# Patient Record
Sex: FEMALE | Race: WHITE | NOT HISPANIC OR LATINO | Employment: OTHER | URBAN - NONMETROPOLITAN AREA
[De-identification: names, ages, dates, MRNs, and addresses within clinical notes are randomized per-mention and may not be internally consistent; named-entity substitution may affect disease eponyms.]

---

## 2019-10-16 ENCOUNTER — TELEPHONE (OUTPATIENT)
Dept: OBGYN | Facility: OTHER | Age: 34
End: 2019-10-16

## 2019-10-16 NOTE — TELEPHONE ENCOUNTER
Wayne Brooks Patient:     Positive pregnancy test : Yes       P:1  Vaginal or C/S:vaginal   LMP :  GA: 5w5d  Prenatal vitamins?: Yes   Bleeding?: No  Cramping?: No  1-sided pelvic pain?: No  Advised patient to be seen ASAP if any of the above symptoms.      NewOB appt scheduled with Wayne on- 2019 @ 3:30    Pt declined dating US.

## 2019-11-20 ENCOUNTER — PRENATAL OFFICE VISIT (OUTPATIENT)
Dept: OBGYN | Facility: OTHER | Age: 34
End: 2019-11-20
Attending: ADVANCED PRACTICE MIDWIFE
Payer: COMMERCIAL

## 2019-11-20 VITALS
SYSTOLIC BLOOD PRESSURE: 108 MMHG | DIASTOLIC BLOOD PRESSURE: 60 MMHG | BODY MASS INDEX: 22.13 KG/M2 | HEART RATE: 68 BPM | WEIGHT: 146 LBS | HEIGHT: 68 IN

## 2019-11-20 DIAGNOSIS — Z11.3 SCREEN FOR STD (SEXUALLY TRANSMITTED DISEASE): ICD-10-CM

## 2019-11-20 DIAGNOSIS — Z34.81 ENCOUNTER FOR SUPERVISION OF OTHER NORMAL PREGNANCY, FIRST TRIMESTER: Primary | ICD-10-CM

## 2019-11-20 DIAGNOSIS — Z34.81 ENCOUNTER FOR SUPERVISION OF OTHER NORMAL PREGNANCY IN FIRST TRIMESTER: ICD-10-CM

## 2019-11-20 LAB
ABO + RH BLD: NORMAL
ABO + RH BLD: NORMAL
ALBUMIN UR-MCNC: NEGATIVE MG/DL
AMPHETAMINES UR QL: NOT DETECTED NG/ML
APPEARANCE UR: CLEAR
BACTERIA #/AREA URNS HPF: ABNORMAL /HPF
BARBITURATES UR QL SCN: NOT DETECTED NG/ML
BENZODIAZ UR QL SCN: NOT DETECTED NG/ML
BILIRUB UR QL STRIP: NEGATIVE
BLD GP AB SCN SERPL QL: NORMAL
BLOOD BANK CMNT PATIENT-IMP: NORMAL
BUPRENORPHINE UR QL: NOT DETECTED NG/ML
CANNABINOIDS UR QL: NOT DETECTED NG/ML
COCAINE UR QL SCN: NOT DETECTED NG/ML
COLOR UR AUTO: ABNORMAL
D-METHAMPHET UR QL: NOT DETECTED NG/ML
ERYTHROCYTE [DISTWIDTH] IN BLOOD BY AUTOMATED COUNT: 12.2 % (ref 10–15)
GLUCOSE UR STRIP-MCNC: NEGATIVE MG/DL
HCT VFR BLD AUTO: 36.3 % (ref 35–47)
HGB BLD-MCNC: 12.7 G/DL (ref 11.7–15.7)
HGB UR QL STRIP: NEGATIVE
HYALINE CASTS #/AREA URNS LPF: 1 /LPF
KETONES UR STRIP-MCNC: NEGATIVE MG/DL
LEUKOCYTE ESTERASE UR QL STRIP: ABNORMAL
MCH RBC QN AUTO: 31.4 PG (ref 26.5–33)
MCHC RBC AUTO-ENTMCNC: 35 G/DL (ref 31.5–36.5)
MCV RBC AUTO: 90 FL (ref 78–100)
METHADONE UR QL SCN: NOT DETECTED NG/ML
MUCOUS THREADS #/AREA URNS LPF: PRESENT /LPF
NITRATE UR QL: NEGATIVE
OPIATES UR QL SCN: NOT DETECTED NG/ML
OXYCODONE UR QL SCN: NOT DETECTED NG/ML
PCP UR QL SCN: NOT DETECTED NG/ML
PH UR STRIP: 5.5 PH (ref 4.7–8)
PLATELET # BLD AUTO: 296 10E9/L (ref 150–450)
PROPOXYPH UR QL: NOT DETECTED NG/ML
RBC # BLD AUTO: 4.05 10E12/L (ref 3.8–5.2)
RBC #/AREA URNS AUTO: 0 /HPF (ref 0–2)
SOURCE: ABNORMAL
SP GR UR STRIP: 1.02 (ref 1–1.03)
SPECIMEN EXP DATE BLD: NORMAL
SPECIMEN SOURCE: NORMAL
SQUAMOUS #/AREA URNS AUTO: 2 /HPF (ref 0–1)
TRICYCLICS UR QL SCN: NOT DETECTED NG/ML
UROBILINOGEN UR STRIP-MCNC: NORMAL MG/DL (ref 0–2)
WBC # BLD AUTO: 9.7 10E9/L (ref 4–11)
WBC #/AREA URNS AUTO: 2 /HPF (ref 0–5)
WET PREP SPEC: NORMAL

## 2019-11-20 PROCEDURE — 81001 URINALYSIS AUTO W/SCOPE: CPT | Mod: 59 | Performed by: ADVANCED PRACTICE MIDWIFE

## 2019-11-20 PROCEDURE — 87210 SMEAR WET MOUNT SALINE/INK: CPT | Performed by: ADVANCED PRACTICE MIDWIFE

## 2019-11-20 PROCEDURE — 86762 RUBELLA ANTIBODY: CPT | Performed by: ADVANCED PRACTICE MIDWIFE

## 2019-11-20 PROCEDURE — 86900 BLOOD TYPING SEROLOGIC ABO: CPT | Performed by: ADVANCED PRACTICE MIDWIFE

## 2019-11-20 PROCEDURE — 36415 COLL VENOUS BLD VENIPUNCTURE: CPT | Performed by: ADVANCED PRACTICE MIDWIFE

## 2019-11-20 PROCEDURE — 87389 HIV-1 AG W/HIV-1&-2 AB AG IA: CPT | Performed by: ADVANCED PRACTICE MIDWIFE

## 2019-11-20 PROCEDURE — 87340 HEPATITIS B SURFACE AG IA: CPT | Performed by: ADVANCED PRACTICE MIDWIFE

## 2019-11-20 PROCEDURE — 86780 TREPONEMA PALLIDUM: CPT | Performed by: ADVANCED PRACTICE MIDWIFE

## 2019-11-20 PROCEDURE — 87591 N.GONORRHOEAE DNA AMP PROB: CPT | Performed by: ADVANCED PRACTICE MIDWIFE

## 2019-11-20 PROCEDURE — 87086 URINE CULTURE/COLONY COUNT: CPT | Performed by: ADVANCED PRACTICE MIDWIFE

## 2019-11-20 PROCEDURE — 80306 DRUG TEST PRSMV INSTRMNT: CPT | Performed by: ADVANCED PRACTICE MIDWIFE

## 2019-11-20 PROCEDURE — 85027 COMPLETE CBC AUTOMATED: CPT | Performed by: ADVANCED PRACTICE MIDWIFE

## 2019-11-20 PROCEDURE — 87491 CHLMYD TRACH DNA AMP PROBE: CPT | Performed by: ADVANCED PRACTICE MIDWIFE

## 2019-11-20 PROCEDURE — 99207 ZZC FIRST OB VISIT: CPT | Performed by: ADVANCED PRACTICE MIDWIFE

## 2019-11-20 PROCEDURE — 86850 RBC ANTIBODY SCREEN: CPT | Performed by: ADVANCED PRACTICE MIDWIFE

## 2019-11-20 PROCEDURE — 86901 BLOOD TYPING SEROLOGIC RH(D): CPT | Performed by: ADVANCED PRACTICE MIDWIFE

## 2019-11-20 RX ORDER — SACCHAROMYCES BOULARDII 250 MG
250 CAPSULE ORAL 2 TIMES DAILY
COMMUNITY
End: 2022-09-08

## 2019-11-20 RX ORDER — PRENATAL VIT/IRON FUM/FOLIC AC 27MG-0.8MG
1 TABLET ORAL DAILY
COMMUNITY

## 2019-11-20 ASSESSMENT — MIFFLIN-ST. JEOR: SCORE: 1410.75

## 2019-11-20 ASSESSMENT — PAIN SCALES - GENERAL: PAINLEVEL: NO PAIN (0)

## 2019-11-20 NOTE — PROGRESS NOTES
NEW OB VISIT  Letitia Whittaker is a 34 year old  at Unknown presenting for a new ob visit.      Currently taking Prenatal Vitamins? y  Folate y    ZIKA n    MEDICAL HISTORY:  Diabetes: No  Hypertension: No  Heart Disease: No  Autoimmune disorder: No  Kidney Disease/UTI: No  Neurologic Disease/Epilepsy:No  Psychiatric Disease: No  Depression/Postpartum Depression:No  Varicositites/Phlebitis: No  Hepatitis/Liver Disease: No  Thyroid Dysfunction: No  Trauma/Violence: No  History of Blood Transfusion: No  Tobacco Use: No  Alcohol Use: No  Illicit/Recreational Drugs: No  D (Rh Sensitized): unsure  Pulmonary Disease (TB/Asthma): No  Drug/Latex Allergies/Reactions: No  Breast: No  GYN Surgery:No  Operations/Hospitalizations:Yes, 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  Anesthetic Complications: No  History of Abnormal Pap:No  Uterine Anomalies/TATI: No  Infertility: No  Artifical Reproductive Technologies Treatment: No  Relevant Family History:No  Other/Comments: No    INFECTION HISTORY:  Are you exposed to TB anywhere you work or live?: n  Do you or your Partner have Genital Herpes: n  Rash or viral illness or fever since LMP: n  Hepatitis B or C: n  History of STI (Gonorrhea, Chlamydia, HPV, HIV, Syphilis): n  Other: n  Cats n    BABY DOC unsure             Breast feeding: y  Card given y      IMMUNIZATION HISTORY:  Chicken Pox: y  Flu Vaccine:  n  Pneumococcal if smoker or Reactive Airway Disease:  n  Tdap: 28 w  HPV vaccinations (Gardasil): n  Other/comments: n    FAMILY HISTORY  Diabetes: n  Hypertension: n  CVA/Stroke: n  Lupus: n  Cancers: Breast  n ovarian n,colon n,uterine: n           Genetics Screening/Teratology Counseling:  Includes Patient, Baby's Father, or anyone in either family with:  Patient's age 35 years or older as of estimated date of delivery:  n  Thalassemia: MCV less than 80: n  Neural Tube defects: n  Congenital Heart Defects: n  Down syndrome: n  Gregorio-Sachs: n  Canavan Disease: n  Familial Dysautonomia:  "n  Sickle Cell Disease or Trait: n  Hemophilia or other blood disorders: n  Muscular Dystrophy: n  Cystic Fibrosis: n  Lovely's Chorea: n  Intellectual development disorder or Autism: n  Other genetic or chromosomal disorders: n  Maternal Metabolic Disorder (Type 1 DM, PKU): n  Patient or baby's father with birth defects not listed above: n  Recurrent pregnancy loss or stillbirth: n  Medications (Supplements, drugs)/ Illicit/ Recreational drugs/ Alcohol since LMP: n  Other/Comments: n    Review Of Systems:   CONSTITUTIONAL:     NEGATIVE for fever, chills, change in weight  INTEGUMENTARY/SKIN:       NEGATIVE for worrisome rashes, moles or lesions  EYES:     NEGATIVE for vision changes or irritation  ENT/MOUTH: NEGATIVE for ear, mouth and throat problems  RESP:     NEGATIVE for significant cough or SOB  CV:   NEGATIVE for chest pain, palpitations or peripheral edema  GI:     NEGATIVE for unusual nausea, abdominal pain, heartburn, or change in bowel   :   NEGATIVE for frequency, dysuria, hematuria, vaginal discharge or bleeding  MUSCULOSKELETAL:     NEGATIVE for significant arthralgias or myalgia  NEURO:      NEGATIVE for weakness, dizziness or paresthesias  ENDOCRINE:      NEGATIVE for temperature intolerance, skin/hair changes  PSYCHIATRIC:      NEGATIVE for changes in mood or affect.     PHYSICAL EXAM:   /60   Pulse 68   Ht 1.727 m (5' 8\")   Wt 66.2 kg (146 lb)   LMP 09/06/2019 (Exact Date)   BMI 22.20 kg/m     BMI: Body mass index is 22.2 kg/m .  Constitutional: healthy, alert and no distress  Head: Normocephalic. No masses, lesions, tenderness or abnormalities  Neck: Neck supple. Trachea midline. No adenopathy. Thyroid symmetric, normal size.   Cardiovascular: RRR.   Respiratory: lungs clear   Breast: Breasts reveal mild symmetric fibrocystic densities, but there are no dominant, discrete, fixed or suspicious masses found.  Gastrointestinal: Abdomen soft, non-tender, non-distended. No masses, " organomegaly.  Pelvic:  Vulva:  No external lesions, normal female hair distribution, no inguinal adenopathy.    Urethra:  Midline, non-tender, well supported, no discharge  Vagina:  Moist, pink, no abnormal discharge, no lesions  Uterus:    , non-tender  Ovaries:  No masses appreciated  Rectal Exam: deferred    Musculoskeletal: extremities normal  Skin: no suspicious lesions or rashes  Psychiatric: Affect appropriate, cooperative,mentation appears normal.     Risk assessment done. Level is   moderate    ASSESSMENT:   G 2 P 1 @ 10 w 5 d  LMP exact/ decline confirmation US  Declines 1st and 2nd  tri screenings  Declines flu shot with education    PLAN:  Prenatal labs   Level II Ultrasound at 20 weeks   Tdap at 27 weeks  Estimated Fetal Weight at 38 weeks prn      Return to Office:  4 weeks for prenatal care and as needed    Greater than 45 were spent in face to face counseling and interview by me for this initial new ob visit.  Wayne WILKERSON, CNM

## 2019-11-21 LAB
C TRACH DNA SPEC QL PROBE+SIG AMP: NOT DETECTED
N GONORRHOEA DNA SPEC QL PROBE+SIG AMP: NOT DETECTED
SPECIMEN SOURCE: NORMAL

## 2019-11-21 NOTE — PATIENT INSTRUCTIONS
Return to office in 4 week(s) for prenatal care and as needed.    If you think your bag of water is broke; have bleeding like a period; think your in labor; or are worried about your baby's movement; please call the labor and delivery unit @ [phone].    Thank you for allowing Wayne WILKERSON CNM and our OB team to participate in your care.  If you have a scheduling or an appointment question please contact [facility] Unit Coordinator at their direct line [phone].   ALL nursing questions or concerns can be directed to your OB nurse at: [phone] Darrius Brito/Tejal

## 2019-11-22 LAB
HBV SURFACE AG SERPL QL IA: NONREACTIVE
HIV 1+2 AB+HIV1 P24 AG SERPL QL IA: NONREACTIVE
RUBV IGG SERPL IA-ACNC: 12 IU/ML
T PALLIDUM AB SER QL: NONREACTIVE

## 2019-11-23 LAB
BACTERIA SPEC CULT: ABNORMAL
SPECIMEN SOURCE: ABNORMAL

## 2019-11-26 ENCOUNTER — TELEPHONE (OUTPATIENT)
Dept: OBGYN | Facility: OTHER | Age: 34
End: 2019-11-26

## 2019-12-18 ENCOUNTER — PRENATAL OFFICE VISIT (OUTPATIENT)
Dept: OBGYN | Facility: OTHER | Age: 34
End: 2019-12-18
Attending: ADVANCED PRACTICE MIDWIFE
Payer: COMMERCIAL

## 2019-12-18 VITALS
DIASTOLIC BLOOD PRESSURE: 63 MMHG | WEIGHT: 150 LBS | HEIGHT: 68 IN | SYSTOLIC BLOOD PRESSURE: 108 MMHG | OXYGEN SATURATION: 99 % | HEART RATE: 70 BPM | BODY MASS INDEX: 22.73 KG/M2

## 2019-12-18 DIAGNOSIS — Z34.81 ENCOUNTER FOR SUPERVISION OF OTHER NORMAL PREGNANCY, FIRST TRIMESTER: Primary | ICD-10-CM

## 2019-12-18 LAB
ALBUMIN UR-MCNC: NEGATIVE MG/DL
APPEARANCE UR: CLEAR
BACTERIA #/AREA URNS HPF: ABNORMAL /HPF
BILIRUB UR QL STRIP: NEGATIVE
COLOR UR AUTO: ABNORMAL
GLUCOSE UR STRIP-MCNC: NEGATIVE MG/DL
HGB UR QL STRIP: NEGATIVE
KETONES UR STRIP-MCNC: NEGATIVE MG/DL
LEUKOCYTE ESTERASE UR QL STRIP: NEGATIVE
NITRATE UR QL: NEGATIVE
PH UR STRIP: 5.5 PH (ref 4.7–8)
RBC #/AREA URNS AUTO: 0 /HPF (ref 0–2)
SOURCE: ABNORMAL
SP GR UR STRIP: 1.01 (ref 1–1.03)
SQUAMOUS #/AREA URNS AUTO: 2 /HPF (ref 0–1)
UROBILINOGEN UR STRIP-MCNC: NORMAL MG/DL (ref 0–2)
WBC #/AREA URNS AUTO: <1 /HPF (ref 0–5)

## 2019-12-18 PROCEDURE — 81001 URINALYSIS AUTO W/SCOPE: CPT | Performed by: ADVANCED PRACTICE MIDWIFE

## 2019-12-18 PROCEDURE — 99207 ZZC PRENATAL VISIT: CPT | Performed by: ADVANCED PRACTICE MIDWIFE

## 2019-12-18 ASSESSMENT — MIFFLIN-ST. JEOR: SCORE: 1428.9

## 2019-12-18 ASSESSMENT — PAIN SCALES - GENERAL: PAINLEVEL: NO PAIN (0)

## 2019-12-18 NOTE — PATIENT INSTRUCTIONS
Return to office in 4 week(s) for prenatal care and as needed.    If you think your bag of water is broke; have bleeding like a period; think your in labor; or are worried about your baby's movement; please call the labor and delivery unit @ [phone].    Thank you for allowing Wayne WILKERSON CNM and our OB team to participate in your care.  If you have a scheduling or an appointment question please contact [facility] Unit Coordinator at their direct line [phone].   ALL nursing questions or concerns can be directed to your OB nurse at: [phone] Darrius Brito/Tejal

## 2019-12-18 NOTE — PROGRESS NOTES
Doing well.  Baby active. Flutters  Denies contractions, bleeding, or leakage of fluid.     Discussed:  Quad screen, anatomy US, call hour, fetal movement    Plan:  Quad screen next visit  Anatomy US 1/28/19    Return to office in 4 weeks for prenatal care and as needed.    Wayne Brooks, RHEA, CNM

## 2019-12-18 NOTE — NURSING NOTE
"Chief Complaint   Patient presents with     Prenatal Care     14w 5d       Initial /63 (BP Location: Left arm, Cuff Size: Adult Regular)   Pulse 70   Ht 1.727 m (5' 8\")   Wt 68 kg (150 lb)   LMP 09/06/2019 (Exact Date)   SpO2 99%   BMI 22.81 kg/m   Estimated body mass index is 22.81 kg/m  as calculated from the following:    Height as of this encounter: 1.727 m (5' 8\").    Weight as of this encounter: 68 kg (150 lb).  Medication Reconciliation: complete  Loida Cummings LPN  "

## 2020-01-15 ENCOUNTER — PRENATAL OFFICE VISIT (OUTPATIENT)
Dept: OBGYN | Facility: OTHER | Age: 35
End: 2020-01-15
Attending: ADVANCED PRACTICE MIDWIFE
Payer: COMMERCIAL

## 2020-01-15 VITALS
SYSTOLIC BLOOD PRESSURE: 114 MMHG | DIASTOLIC BLOOD PRESSURE: 72 MMHG | HEIGHT: 68 IN | BODY MASS INDEX: 23.19 KG/M2 | WEIGHT: 153 LBS

## 2020-01-15 DIAGNOSIS — Z34.82 ENCOUNTER FOR SUPERVISION OF OTHER NORMAL PREGNANCY, SECOND TRIMESTER: Primary | ICD-10-CM

## 2020-01-15 DIAGNOSIS — Z34.81 ENCOUNTER FOR SUPERVISION OF OTHER NORMAL PREGNANCY, FIRST TRIMESTER: ICD-10-CM

## 2020-01-15 PROCEDURE — 99207 ZZC PRENATAL VISIT: CPT | Performed by: ADVANCED PRACTICE MIDWIFE

## 2020-01-15 PROCEDURE — 81511 FTL CGEN ABNOR FOUR ANAL: CPT | Mod: 90 | Performed by: ADVANCED PRACTICE MIDWIFE

## 2020-01-15 PROCEDURE — 99000 SPECIMEN HANDLING OFFICE-LAB: CPT | Performed by: ADVANCED PRACTICE MIDWIFE

## 2020-01-15 PROCEDURE — 36415 COLL VENOUS BLD VENIPUNCTURE: CPT | Performed by: ADVANCED PRACTICE MIDWIFE

## 2020-01-15 ASSESSMENT — PAIN SCALES - GENERAL: PAINLEVEL: NO PAIN (0)

## 2020-01-15 ASSESSMENT — MIFFLIN-ST. JEOR: SCORE: 1442.5

## 2020-01-15 NOTE — PATIENT INSTRUCTIONS
Return to office in 4 week(s) for prenatal care and as needed.    If you think your bag of water is broke; have bleeding like a period; think your in labor; or are worried about your baby's movement; please call the labor and delivery unit @ [phone].    Thank you for allowing Wayne WILKERSON CNM and our OB team to participate in your care.  If you have a scheduling or an appointment question please contact [facility] Unit Coordinator at their direct line [phone].   ALL nursing questions or concerns can be directed to your OB nurse at: [phone] Darrius Brito/Tejal

## 2020-01-15 NOTE — PROGRESS NOTES
Doing well.  Baby active.   Denies contractions, bleeding, or leakage of fluid.     Discussed:  Anatomy US, Quad screen, fetal movement     Plan:  US on 1/28/20    Return to office in 4 weeks for prenatal care and as needed.    RHEA Barton, CNM

## 2020-01-15 NOTE — NURSING NOTE
"Chief Complaint   Patient presents with     Prenatal Care     18w5d       Initial /72 (BP Location: Left arm, Patient Position: Chair, Cuff Size: Adult Regular)   Ht 1.727 m (5' 8\")   Wt 69.4 kg (153 lb)   LMP 09/06/2019 (Exact Date)   BMI 23.26 kg/m   Estimated body mass index is 23.26 kg/m  as calculated from the following:    Height as of this encounter: 1.727 m (5' 8\").    Weight as of this encounter: 69.4 kg (153 lb).  Medication Reconciliation: complete  Alisha Mendes LPN    "

## 2020-01-19 LAB
# FETUSES US: NORMAL
# FETUSES: NORMAL
AFP ADJ MOM AMN: 0.57
AFP SERPL-MCNC: 27 NG/ML
AGE - REPORTED: 34.9 YR
CURRENT SMOKER: NO
CURRENT SMOKER: NO
DIABETES STATUS PATIENT: NO
FAMILY MEMBER DISEASES HX: NO
FAMILY MEMBER DISEASES HX: NO
GA METHOD: NORMAL
GA METHOD: NORMAL
GA: NORMAL WK
HCG MOM SERPL: 1.11
HCG SERPL-ACNC: NORMAL IU/L
HX OF HEREDITARY DISORDERS: NO
IDDM PATIENT QL: NO
INHIBIN A MOM SERPL: 0.79
INHIBIN A SERPL-MCNC: 128 PG/ML
INTEGRATED SCN PATIENT-IMP: NORMAL
IVF PREGNANCY: NO
LMP START DATE: NORMAL
MONOCHORIONIC TWINS: NO
PATHOLOGY STUDY: NORMAL
PREV FETUS DEFECT: NO
SERVICE CMNT-IMP: NO
SPECIMEN DRAWN SERPL: NORMAL
U ESTRIOL MOM SERPL: 0.74
U ESTRIOL SERPL-MCNC: 1.41 NG/ML
VALPROIC/CARBAMAZEPINE STATUS: NO
WEIGHT UNITS: NORMAL

## 2020-01-28 ENCOUNTER — HOSPITAL ENCOUNTER (OUTPATIENT)
Dept: ULTRASOUND IMAGING | Facility: HOSPITAL | Age: 35
Discharge: HOME | End: 2020-01-28
Attending: ADVANCED PRACTICE MIDWIFE | Admitting: ADVANCED PRACTICE MIDWIFE
Payer: COMMERCIAL

## 2020-01-28 ENCOUNTER — HOSPITAL ENCOUNTER (OUTPATIENT)
Dept: ULTRASOUND IMAGING | Facility: CLINIC | Age: 35
End: 2020-01-28
Attending: ADVANCED PRACTICE MIDWIFE
Payer: COMMERCIAL

## 2020-01-28 ENCOUNTER — OFFICE VISIT (OUTPATIENT)
Dept: MATERNAL FETAL MEDICINE | Facility: CLINIC | Age: 35
End: 2020-01-28
Attending: ADVANCED PRACTICE MIDWIFE
Payer: COMMERCIAL

## 2020-01-28 DIAGNOSIS — Z34.81 ENCOUNTER FOR SUPERVISION OF OTHER NORMAL PREGNANCY, FIRST TRIMESTER: ICD-10-CM

## 2020-01-28 DIAGNOSIS — Z36.89 ENCOUNTER FOR FETAL ANATOMIC SURVEY: Primary | ICD-10-CM

## 2020-01-28 PROCEDURE — 76811 OB US DETAILED SNGL FETUS: CPT | Mod: TC

## 2020-01-28 NOTE — PROGRESS NOTES
Type of service:    Telemedicine Office Visit for fetal ultrasound    Date of service:     Date: 01/28/20     Time service began:    8:00 AM  Time service ended:    9:00 AM    Reason:      .tel: Patient unable to travel    Description of basis or telemedicine appropriateness:     Consultation provided at the request of Wayne Brooks CNM for advice regarding the diagnosis and treatment of this patient's pregnancy.  The patient's condition can be safely assessed via telemedicine.    The Mode of Transmission:    Secure interactive audio and visual telecommunication system (Video Guidance)    Location of originating and distant sites:      Originating site:   [place], [place]    Distant site:    [place], [place]    Suad Victoria DO

## 2020-02-10 ENCOUNTER — PRENATAL OFFICE VISIT (OUTPATIENT)
Dept: OBGYN | Facility: OTHER | Age: 35
End: 2020-02-10
Attending: ADVANCED PRACTICE MIDWIFE
Payer: COMMERCIAL

## 2020-02-10 VITALS — WEIGHT: 158 LBS | BODY MASS INDEX: 24.02 KG/M2 | SYSTOLIC BLOOD PRESSURE: 104 MMHG | DIASTOLIC BLOOD PRESSURE: 64 MMHG

## 2020-02-10 DIAGNOSIS — Z34.82 ENCOUNTER FOR SUPERVISION OF OTHER NORMAL PREGNANCY, SECOND TRIMESTER: Primary | ICD-10-CM

## 2020-02-10 PROBLEM — Z34.80 SUPERVISION OF OTHER NORMAL PREGNANCY, ANTEPARTUM: Status: ACTIVE | Noted: 2019-11-20

## 2020-02-10 PROCEDURE — 99207 ZZC PRENATAL VISIT: CPT | Performed by: ADVANCED PRACTICE MIDWIFE

## 2020-02-10 ASSESSMENT — PAIN SCALES - GENERAL: PAINLEVEL: NO PAIN (0)

## 2020-02-10 NOTE — PATIENT INSTRUCTIONS
Return to office in 4 week(s) for prenatal care and as needed.    If you think your bag of water is broke; have bleeding like a period; think your in labor; or are worried about your baby's movement; please call the labor and delivery unit @ [phone].    Thank you for allowing Wayne WILKERSON CNM and our OB team to participate in your care.  If you have a scheduling or an appointment question please contact [facility] Unit Coordinator at their direct line [phone].   ALL nursing questions or concerns can be directed to your OB nurse at: [phone] Darrius Brito/Tejal

## 2020-02-10 NOTE — PROGRESS NOTES
Doing well.  Baby active.   Denies contractions, bleeding, or leakage of fluid.     Discussed:  Estimated due date, 3rd tri labs, alternative for GTT, Tdap, fetal movements    Plan:  At 28 weeks:   3rd tri labs   1 hr GTT/ alternative    Tdap/ declines at this time/ will research   NB guidance    Return to office in 4 weeks for prenatal care and as needed.    Wayne Brooks, APRN, CNM

## 2020-03-09 ENCOUNTER — PRENATAL OFFICE VISIT (OUTPATIENT)
Dept: OBGYN | Facility: OTHER | Age: 35
End: 2020-03-09
Attending: ADVANCED PRACTICE MIDWIFE
Payer: COMMERCIAL

## 2020-03-09 VITALS — WEIGHT: 159 LBS | SYSTOLIC BLOOD PRESSURE: 100 MMHG | DIASTOLIC BLOOD PRESSURE: 58 MMHG | BODY MASS INDEX: 24.18 KG/M2

## 2020-03-09 DIAGNOSIS — Z34.82 ENCOUNTER FOR SUPERVISION OF OTHER NORMAL PREGNANCY, SECOND TRIMESTER: Primary | ICD-10-CM

## 2020-03-09 DIAGNOSIS — Z34.80 SUPERVISION OF OTHER NORMAL PREGNANCY, ANTEPARTUM: ICD-10-CM

## 2020-03-09 PROCEDURE — 99207 ZZC PRENATAL VISIT: CPT | Performed by: ADVANCED PRACTICE MIDWIFE

## 2020-03-09 ASSESSMENT — PAIN SCALES - GENERAL: PAINLEVEL: NO PAIN (0)

## 2020-03-09 NOTE — PATIENT INSTRUCTIONS
Return to office in 2 week(s) for prenatal care and as needed.    If you think your bag of water is broke; have bleeding like a period; think your in labor; or are worried about your baby's movement; please call the labor and delivery unit @ [phone].    Thank you for allowing Wayne WILKERSON CNM and our OB team to participate in your care.  If you have a scheduling or an appointment question please contact [facility] Unit Coordinator at their direct line [phone].   ALL nursing questions or concerns can be directed to your OB nurse at: [phone] Darrius Brito/Tejal

## 2020-03-09 NOTE — PROGRESS NOTES
Doing well.  Baby active.   Denies contractions, bleeding, or leakage of fluid.     Discussed:  Request for BG monitoring rather than GTT, DRC, 3rd tri labs, Tdap, fetal movement    Plan:  Next visit:   3rd tri labs   Tdap  Schedule DRC appt for BG monitoring       Return to office in 2 weeks for prenatal care and as needed.    RHEA Barton, CNM

## 2020-03-16 DIAGNOSIS — Z34.80 SUPERVISION OF OTHER NORMAL PREGNANCY, ANTEPARTUM: Primary | ICD-10-CM

## 2020-03-16 NOTE — TELEPHONE ENCOUNTER
Pt was scheduled for appointment for meter teaching tomorrow.  Appointment cancelled to avoid risk of COVID-19.  Spoke with pt and she has tested glucose levels with past pregnancy and feels she will be able to use meter without teaching.  Please place order to [facility] in [place] for Accu-Chek Guide meter, Accu-Check Guide strips (test 4x/day) and Accu-Chek Fastclix lancets.  Glucose log sheet scanned to pt's email and she will begin using and bring to OB/GYN appointments.   Thanks!

## 2020-03-25 ENCOUNTER — PRENATAL OFFICE VISIT (OUTPATIENT)
Dept: OBGYN | Facility: OTHER | Age: 35
End: 2020-03-25
Attending: ADVANCED PRACTICE MIDWIFE
Payer: COMMERCIAL

## 2020-03-25 VITALS — WEIGHT: 162 LBS | SYSTOLIC BLOOD PRESSURE: 104 MMHG | DIASTOLIC BLOOD PRESSURE: 58 MMHG | BODY MASS INDEX: 24.63 KG/M2

## 2020-03-25 DIAGNOSIS — Z34.83 ENCOUNTER FOR SUPERVISION OF OTHER NORMAL PREGNANCY, THIRD TRIMESTER: Primary | ICD-10-CM

## 2020-03-25 DIAGNOSIS — Z34.80 SUPERVISION OF OTHER NORMAL PREGNANCY, ANTEPARTUM: ICD-10-CM

## 2020-03-25 DIAGNOSIS — Z34.82 ENCOUNTER FOR SUPERVISION OF OTHER NORMAL PREGNANCY, SECOND TRIMESTER: ICD-10-CM

## 2020-03-25 LAB
ALBUMIN UR-MCNC: NEGATIVE MG/DL
APPEARANCE UR: CLEAR
BACTERIA #/AREA URNS HPF: ABNORMAL /HPF
BILIRUB UR QL STRIP: NEGATIVE
COLOR UR AUTO: ABNORMAL
ERYTHROCYTE [DISTWIDTH] IN BLOOD BY AUTOMATED COUNT: 12.5 % (ref 10–15)
GLUCOSE UR STRIP-MCNC: NEGATIVE MG/DL
HCT VFR BLD AUTO: 35.2 % (ref 35–47)
HGB BLD-MCNC: 12.4 G/DL (ref 11.7–15.7)
HGB UR QL STRIP: NEGATIVE
KETONES UR STRIP-MCNC: NEGATIVE MG/DL
LEUKOCYTE ESTERASE UR QL STRIP: NEGATIVE
MCH RBC QN AUTO: 32.1 PG (ref 26.5–33)
MCHC RBC AUTO-ENTMCNC: 35.2 G/DL (ref 31.5–36.5)
MCV RBC AUTO: 91 FL (ref 78–100)
NITRATE UR QL: NEGATIVE
PH UR STRIP: 7 PH (ref 4.7–8)
PLATELET # BLD AUTO: 243 10E9/L (ref 150–450)
RBC # BLD AUTO: 3.86 10E12/L (ref 3.8–5.2)
RBC #/AREA URNS AUTO: <1 /HPF (ref 0–2)
SOURCE: ABNORMAL
SP GR UR STRIP: 1 (ref 1–1.03)
UROBILINOGEN UR STRIP-MCNC: NORMAL MG/DL (ref 0–2)
WBC # BLD AUTO: 10.2 10E9/L (ref 4–11)
WBC #/AREA URNS AUTO: 1 /HPF (ref 0–5)

## 2020-03-25 PROCEDURE — 36415 COLL VENOUS BLD VENIPUNCTURE: CPT | Performed by: ADVANCED PRACTICE MIDWIFE

## 2020-03-25 PROCEDURE — 85027 COMPLETE CBC AUTOMATED: CPT | Performed by: ADVANCED PRACTICE MIDWIFE

## 2020-03-25 PROCEDURE — 86780 TREPONEMA PALLIDUM: CPT | Performed by: ADVANCED PRACTICE MIDWIFE

## 2020-03-25 PROCEDURE — 99207 ZZC PRENATAL VISIT: CPT | Performed by: ADVANCED PRACTICE MIDWIFE

## 2020-03-25 PROCEDURE — 81001 URINALYSIS AUTO W/SCOPE: CPT | Performed by: ADVANCED PRACTICE MIDWIFE

## 2020-03-25 ASSESSMENT — PAIN SCALES - GENERAL: PAINLEVEL: NO PAIN (0)

## 2020-03-25 NOTE — PATIENT INSTRUCTIONS
Return to office in 2 week(s) for prenatal care and as needed.    If you think your bag of water is broke; have bleeding like a period; think your in labor; or are worried about your baby's movement; please call the labor and delivery unit @ [phone].    Thank you for allowing Wayne WILKERSON CNM and our OB team to participate in your care.  If you have a scheduling or an appointment question please contact [facility] Unit Coordinator at their direct line [phone].   ALL nursing questions or concerns can be directed to your OB nurse at: [phone] Darrius Brito/Tejal

## 2020-03-25 NOTE — PROGRESS NOTES
Doing well.  Baby active.   Denies contractions, bleeding, or leakage of fluid.     Discussed:  Blood glucose testing, reviewed BG results for past 2 days, socila distancing, visitors, fetal movement, 3rd tri labs, Tdap declined with education  Anticipatory Guidance  care in hospital  Respiratory therapy called for all deliveries  Skin to skin  Immunizations/meds   -Hepatitis B   -Erythromycin   -Vitamin K  Screening   -Hearing   -CCHD   -Bilirubin   -Metabolic  Rooming in  Feeding:  Breast  Car seats  Provider/appointments     Plan:  3rd tri labs  BG screening at home    Return to office in 2 weeks for prenatal care and as needed.    Wayne Brooks, APRN, CNM

## 2020-03-26 LAB — T PALLIDUM AB SER QL: NONREACTIVE

## 2020-04-08 ENCOUNTER — VIRTUAL VISIT (OUTPATIENT)
Dept: OBGYN | Facility: OTHER | Age: 35
End: 2020-04-08
Attending: ADVANCED PRACTICE MIDWIFE
Payer: COMMERCIAL

## 2020-04-08 DIAGNOSIS — Z34.80 SUPERVISION OF OTHER NORMAL PREGNANCY, ANTEPARTUM: ICD-10-CM

## 2020-04-08 DIAGNOSIS — Z34.83 ENCOUNTER FOR SUPERVISION OF OTHER NORMAL PREGNANCY, THIRD TRIMESTER: Primary | ICD-10-CM

## 2020-04-08 PROCEDURE — 99207 ZZC PRENATAL VISIT: CPT | Performed by: ADVANCED PRACTICE MIDWIFE

## 2020-04-08 ASSESSMENT — PAIN SCALES - GENERAL: PAINLEVEL: NO PAIN (0)

## 2020-04-08 NOTE — PATIENT INSTRUCTIONS
Return to office in 2 week(s) for prenatal care and as needed.    If you think your bag of water is broke; have bleeding like a period; think your in labor; or are worried about your baby's movement; please call the labor and delivery unit @ [phone].    Thank you for allowing aWyne WILKERSON CNM and our OB team to participate in your care.  If you have a scheduling or an appointment question please contact [facility] Unit Coordinator at their direct line [phone].   ALL nursing questions or concerns can be directed to your OB nurse at: [phone] Darrius Brito/Tejal

## 2020-04-08 NOTE — NURSING NOTE
"Chief Complaint   Patient presents with     Telephone     Prenatal Care     30w5d       Initial LMP 09/06/2019 (Exact Date)  Estimated body mass index is 24.63 kg/m  as calculated from the following:    Height as of 1/15/20: 1.727 m (5' 8\").    Weight as of 3/25/20: 73.5 kg (162 lb).  Medication Reconciliation: complete  Alisha Mendes LPN    "

## 2020-04-08 NOTE — PROGRESS NOTES
"Letitia Whittaker is being evaluated via a billable telephone visit.         The patient has been notified of following:       \"This telephone visit will be conducted via a call between you and your physician/provider. We have found that certain health care needs can be provided without the need for a physical exam.  This service lets us provide the care you need with a short phone conversation.  If a prescription is necessary we can send it directly to your pharmacy.  If lab work is needed we can place an order for that and you can then stop by our lab to have the test done at a later time.       If during the course of the call the physician/provider feels a telephone visit is not appropriate, you will not be charged for this service.\"       Physician has received verbal consent for a Telephone Visit from the patient?  y    Doing well.  Baby active.   Denies contractions, bleeding, or leakage of fluid.     Discussed:  Fetal movement, delayed cord clamping, PTL    FBS:  All < than 95  Post meals:  Highest 112    Plan:  GBS PCR @ 36 w    Return to office in 2 weeks for prenatal care and as needed.    RHEA Barton, CNM    Telephone visit greater than 5-10 minutes discussing prenatal care.  "

## 2020-04-22 ENCOUNTER — VIRTUAL VISIT (OUTPATIENT)
Dept: OBGYN | Facility: OTHER | Age: 35
End: 2020-04-22
Attending: ADVANCED PRACTICE MIDWIFE
Payer: COMMERCIAL

## 2020-04-22 DIAGNOSIS — Z34.83 ENCOUNTER FOR SUPERVISION OF OTHER NORMAL PREGNANCY, THIRD TRIMESTER: Primary | ICD-10-CM

## 2020-04-22 PROCEDURE — 99207 ZZC PRENATAL VISIT: CPT | Performed by: ADVANCED PRACTICE MIDWIFE

## 2020-04-22 ASSESSMENT — PAIN SCALES - GENERAL: PAINLEVEL: NO PAIN (0)

## 2020-04-22 NOTE — PROGRESS NOTES
"Letitia Whittaker is being evaluated via a billable telephone visit.         The patient has been notified of following:       \"This telephone visit will be conducted via a call between you and your physician/provider. We have found that certain health care needs can be provided without the need for a physical exam.  This service lets us provide the care you need with a short phone conversation.  If a prescription is necessary we can send it directly to your pharmacy.  If lab work is needed we can place an order for that and you can then stop by our lab to have the test done at a later time.       If during the course of the call the physician/provider feels a telephone visit is not appropriate, you will not be charged for this service.\"       Physician has received verbal consent for a Telephone Visit from the patient?  y      Doing well.  Baby active.   Denies contractions, bleeding, or leakage of fluid.     Discussed:  Management of the 3rd of stage of labor, fetal movement    Plan:  GBS PCR @ 36 w    Return to office in 2 weeks for prenatal care and as needed.    RHEA Barton, CNM    "

## 2020-04-22 NOTE — PROGRESS NOTES
"Chief Complaint   Patient presents with     Prenatal Care     32w5d     Telephone       Initial LMP 09/06/2019 (Exact Date)  Estimated body mass index is 24.63 kg/m  as calculated from the following:    Height as of 1/15/20: 1.727 m (5' 8\").    Weight as of 3/25/20: 73.5 kg (162 lb).  Medication Reconciliation: complete  Alisha Mendes LPN    "

## 2020-04-22 NOTE — PATIENT INSTRUCTIONS
Return to office in 2 week(s) for prenatal care and as needed.    If you think your bag of water is broke; have bleeding like a period; think your in labor; or are worried about your baby's movement; please call the labor and delivery unit @ [phone].    Thank you for allowing Wayne WILKERSON CNM and our OB team to participate in your care.  If you have a scheduling or an appointment question please contact [facility] Unit Coordinator at their direct line [phone].   ALL nursing questions or concerns can be directed to your OB nurse at: [phone] Darrius Brito/Tejal

## 2020-05-06 ENCOUNTER — PRENATAL OFFICE VISIT (OUTPATIENT)
Dept: OBGYN | Facility: OTHER | Age: 35
End: 2020-05-06
Attending: ADVANCED PRACTICE MIDWIFE
Payer: COMMERCIAL

## 2020-05-06 VITALS
WEIGHT: 171 LBS | HEIGHT: 68 IN | DIASTOLIC BLOOD PRESSURE: 64 MMHG | SYSTOLIC BLOOD PRESSURE: 108 MMHG | BODY MASS INDEX: 25.91 KG/M2

## 2020-05-06 DIAGNOSIS — Z34.80 SUPERVISION OF OTHER NORMAL PREGNANCY, ANTEPARTUM: ICD-10-CM

## 2020-05-06 PROCEDURE — 99207 ZZC PRENATAL VISIT: CPT | Performed by: ADVANCED PRACTICE MIDWIFE

## 2020-05-06 ASSESSMENT — PAIN SCALES - GENERAL: PAINLEVEL: NO PAIN (0)

## 2020-05-06 ASSESSMENT — MIFFLIN-ST. JEOR: SCORE: 1524.15

## 2020-05-06 NOTE — NURSING NOTE
"Chief Complaint   Patient presents with     Prenatal Care     34 5/7       Initial LMP 09/06/2019 (Exact Date)  Estimated body mass index is 24.63 kg/m  as calculated from the following:    Height as of 1/15/20: 1.727 m (5' 8\").    Weight as of 3/25/20: 73.5 kg (162 lb).  Medication Reconciliation: complete  JENNIFER ELLINGTON LPN  "

## 2020-05-06 NOTE — NURSING NOTE
"Chief Complaint   Patient presents with     Prenatal Care     34 5/7       Initial LMP 09/06/2019 (Exact Date)  Estimated body mass index is 24.63 kg/m  as calculated from the following:    Height as of 1/15/20: 1.727 m (5' 8\").    Weight as of 3/25/20: 73.5 kg (162 lb).  Medication Reconciliation: {Medication Reconciliation:[id number]}  JENNIFER ELLINGTON LPN  "

## 2020-05-06 NOTE — PATIENT INSTRUCTIONS
Return to office in 1 week(s) for prenatal care and as needed.    If you think your bag of water is broke; have bleeding like a period; think your in labor; or are worried about your baby's movement; please call the labor and delivery unit @ [phone].    Thank you for allowing Wayne WILKERSON CNM and our OB team to participate in your care.  If you have a scheduling or an appointment question please contact [facility] Unit Coordinator at their direct line [phone].   ALL nursing questions or concerns can be directed to your OB nurse at: [phone] Darrius Brito/Tejal

## 2020-05-06 NOTE — PROGRESS NOTES
Doing well.  Baby active.   Denies contractions, bleeding, or leakage of fluid.     Discussed:  GBS screening, , baby doctor, fetal movement    Plan:  Next visit:  GBS PCR  US for presentation and fluid check    Return to office in 1 weeks for prenatal care and as needed.    RHEA Barton, CNM

## 2020-05-13 ENCOUNTER — PRENATAL OFFICE VISIT (OUTPATIENT)
Dept: OBGYN | Facility: OTHER | Age: 35
End: 2020-05-13
Attending: ADVANCED PRACTICE MIDWIFE
Payer: COMMERCIAL

## 2020-05-13 VITALS — BODY MASS INDEX: 25.7 KG/M2 | SYSTOLIC BLOOD PRESSURE: 102 MMHG | DIASTOLIC BLOOD PRESSURE: 60 MMHG | WEIGHT: 169 LBS

## 2020-05-13 DIAGNOSIS — Z34.83 ENCOUNTER FOR SUPERVISION OF OTHER NORMAL PREGNANCY, THIRD TRIMESTER: Primary | ICD-10-CM

## 2020-05-13 PROCEDURE — 99207 ZZC PRENATAL VISIT: CPT | Performed by: ADVANCED PRACTICE MIDWIFE

## 2020-05-13 ASSESSMENT — PAIN SCALES - GENERAL: PAINLEVEL: NO PAIN (0)

## 2020-05-13 NOTE — PATIENT INSTRUCTIONS
Return to office in 1 week(s) for prenatal care and as needed.    If you think your bag of water is broke; have bleeding like a period; think your in labor; or are worried about your baby's movement; please call the labor and delivery unit @ [phone].    Thank you for allowing Wayne WILKERSON CNM and our OB team to participate in your care.  If you have a scheduling or an appointment question please contact [facility] Unit Coordinator at their direct line [phone].   ALL nursing questions or concerns can be directed to your OB nurse at: [phone] Darrius Brito/Tejal

## 2020-05-13 NOTE — PROGRESS NOTES
Doing well.  Baby active.   Denies bleeding, or leakage of fluid. Mild contractions on occasion    Discussed:  Labor, when to go to hospital, GBS screening, fetal movement    Plan:  GBS PCR next visit  (pt left without completing)  US for presentation and fluid check    Return to office in 1 weeks for prenatal care and as needed.    RHEA Barton, CNM

## 2020-05-20 ENCOUNTER — PRENATAL OFFICE VISIT (OUTPATIENT)
Dept: OBGYN | Facility: OTHER | Age: 35
End: 2020-05-20
Attending: ADVANCED PRACTICE MIDWIFE
Payer: COMMERCIAL

## 2020-05-20 VITALS
WEIGHT: 171 LBS | DIASTOLIC BLOOD PRESSURE: 64 MMHG | SYSTOLIC BLOOD PRESSURE: 118 MMHG | HEIGHT: 68 IN | BODY MASS INDEX: 25.91 KG/M2

## 2020-05-20 DIAGNOSIS — Z34.83 ENCOUNTER FOR SUPERVISION OF OTHER NORMAL PREGNANCY, THIRD TRIMESTER: Primary | ICD-10-CM

## 2020-05-20 PROCEDURE — 99207 ZZC PRENATAL VISIT: CPT | Performed by: ADVANCED PRACTICE MIDWIFE

## 2020-05-20 PROCEDURE — 87653 STREP B DNA AMP PROBE: CPT | Performed by: ADVANCED PRACTICE MIDWIFE

## 2020-05-20 ASSESSMENT — MIFFLIN-ST. JEOR: SCORE: 1524.15

## 2020-05-20 ASSESSMENT — PAIN SCALES - GENERAL: PAINLEVEL: NO PAIN (0)

## 2020-05-20 NOTE — NURSING NOTE
"Chief Complaint   Patient presents with     Prenatal Care     36 5/7       Initial /64 (BP Location: Left arm, Patient Position: Sitting, Cuff Size: Adult Regular)   Ht 1.727 m (5' 8\")   Wt 77.6 kg (171 lb)   LMP 09/06/2019 (Exact Date)   BMI 26.00 kg/m   Estimated body mass index is 26 kg/m  as calculated from the following:    Height as of this encounter: 1.727 m (5' 8\").    Weight as of this encounter: 77.6 kg (171 lb).  Medication Reconciliation: complete  JENNIFER ELLINGTON LPN  "

## 2020-05-20 NOTE — PROGRESS NOTES
Doing well.  Baby active.   Denies bleeding, or leakage of fluid. Some occasional contractions    Discussed:  Fundus height, birth plan, GBS screening, pain management, fetal movement    Plan:  GBS PCR    Return to office in 1 weeks for prenatal care and as needed.    RHEA Barton, CNM

## 2020-05-21 LAB
GP B STREP DNA SPEC QL NAA+PROBE: NEGATIVE
SPECIMEN SOURCE: NORMAL

## 2020-05-27 ENCOUNTER — PRENATAL OFFICE VISIT (OUTPATIENT)
Dept: OBGYN | Facility: OTHER | Age: 35
End: 2020-05-27
Attending: ADVANCED PRACTICE MIDWIFE
Payer: COMMERCIAL

## 2020-05-27 VITALS — SYSTOLIC BLOOD PRESSURE: 110 MMHG | DIASTOLIC BLOOD PRESSURE: 70 MMHG | WEIGHT: 172 LBS | BODY MASS INDEX: 26.15 KG/M2

## 2020-05-27 DIAGNOSIS — Z34.83 ENCOUNTER FOR SUPERVISION OF OTHER NORMAL PREGNANCY, THIRD TRIMESTER: Primary | ICD-10-CM

## 2020-05-27 PROCEDURE — 99207 ZZC PRENATAL VISIT: CPT | Performed by: ADVANCED PRACTICE MIDWIFE

## 2020-05-27 ASSESSMENT — PAIN SCALES - GENERAL: PAINLEVEL: NO PAIN (0)

## 2020-05-27 NOTE — PROGRESS NOTES
Doing well.  Baby active.   Denies contractions, bleeding, or leakage of fluid.     Discussed:  Labor, when to go to hospital, fetal movement    Plan:  Birth plan next week    Return to office in 1 weeks for prenatal care and as needed.    RHEA Barton, CNM

## 2020-05-27 NOTE — PATIENT INSTRUCTIONS
Return to office in 1 week(s) for prenatal care and as needed.    If you think your bag of water is broke; have bleeding like a period; think your in labor; or are worried about your baby's movement; please call the labor and delivery unit @ [phone].    Thank you for allowing Wayne WILKERSON CNM and our OB team to participate in your care.  If you have a scheduling or an appointment question please contact [facility] Unit Coordinator at their direct line [phone].   ALL nursing questions or concerns can be directed to your OB nurse at: [phone] Darrius Brito/Tejal

## 2020-06-01 ENCOUNTER — PRENATAL OFFICE VISIT (OUTPATIENT)
Dept: OBGYN | Facility: OTHER | Age: 35
End: 2020-06-01
Attending: ADVANCED PRACTICE MIDWIFE
Payer: COMMERCIAL

## 2020-06-01 VITALS
BODY MASS INDEX: 26.07 KG/M2 | SYSTOLIC BLOOD PRESSURE: 100 MMHG | WEIGHT: 172 LBS | HEIGHT: 68 IN | DIASTOLIC BLOOD PRESSURE: 56 MMHG

## 2020-06-01 DIAGNOSIS — Z34.83 ENCOUNTER FOR SUPERVISION OF OTHER NORMAL PREGNANCY, THIRD TRIMESTER: Primary | ICD-10-CM

## 2020-06-01 PROCEDURE — 99207 ZZC PRENATAL VISIT: CPT | Performed by: ADVANCED PRACTICE MIDWIFE

## 2020-06-01 ASSESSMENT — PAIN SCALES - GENERAL: PAINLEVEL: NO PAIN (0)

## 2020-06-01 ASSESSMENT — MIFFLIN-ST. JEOR: SCORE: 1528.69

## 2020-06-01 NOTE — NURSING NOTE
"Chief Complaint   Patient presents with     Prenatal Care     38 3/7       Initial /56 (BP Location: Left arm, Patient Position: Sitting, Cuff Size: Adult Regular)   Ht 1.727 m (5' 8\")   Wt 78 kg (172 lb)   LMP 09/06/2019 (Exact Date)   BMI 26.15 kg/m   Estimated body mass index is 26.15 kg/m  as calculated from the following:    Height as of this encounter: 1.727 m (5' 8\").    Weight as of this encounter: 78 kg (172 lb).  Medication Reconciliation: complete  JENNIFER ELLINGTON LPN  "

## 2020-06-01 NOTE — PATIENT INSTRUCTIONS
Return to office in 1 week(s) for prenatal care and as needed.    If you think your bag of water is broke; have bleeding like a period; think your in labor; or are worried about your baby's movement; please call the labor and delivery unit @ [phone].    Thank you for allowing Wayne WILKERSON CNM and our OB team to participate in your care.  If you have a scheduling or an appointment question please contact [facility] Unit Coordinator at their direct line [phone].   ALL nursing questions or concerns can be directed to your OB nurse at: [phone] Darrius Brito/Tejal

## 2020-06-01 NOTE — PROGRESS NOTES
Doing well.  Baby active.   Denies  bleeding, or leakage of fluid. Mild contractions    Discussed:  EFW, labor, when to go to hospital, fetal movement    Reports first child 7 lb 5 oz.  Feels this baby is bigger.    Plan:  Offered EFW    Return to office in 1 weeks for prenatal care and as needed.    RHEA Barton, CNM

## 2020-06-10 ENCOUNTER — PRENATAL OFFICE VISIT (OUTPATIENT)
Dept: OBGYN | Facility: OTHER | Age: 35
End: 2020-06-10
Attending: ADVANCED PRACTICE MIDWIFE
Payer: COMMERCIAL

## 2020-06-10 VITALS — BODY MASS INDEX: 26.15 KG/M2 | DIASTOLIC BLOOD PRESSURE: 62 MMHG | SYSTOLIC BLOOD PRESSURE: 102 MMHG | WEIGHT: 172 LBS

## 2020-06-10 DIAGNOSIS — Z34.80 SUPERVISION OF OTHER NORMAL PREGNANCY, ANTEPARTUM: ICD-10-CM

## 2020-06-10 PROCEDURE — 99207 ZZC PRENATAL VISIT: CPT | Performed by: ADVANCED PRACTICE MIDWIFE

## 2020-06-10 ASSESSMENT — PAIN SCALES - GENERAL: PAINLEVEL: NO PAIN (0)

## 2020-06-10 NOTE — PATIENT INSTRUCTIONS
Return to office in 1 week(s) for prenatal care and as needed.    If you think your bag of water is broke; have bleeding like a period; think your in labor; or are worried about your baby's movement; please call the labor and delivery unit @ [phone].    Thank you for allowing Wayne WILKERSON CNM and our OB team to participate in your care.  If you have a scheduling or an appointment question please contact [facility] Unit Coordinator at their direct line [phone].   ALL nursing questions or concerns can be directed to your OB nurse at: [phone] Darrius Brito/Tejal

## 2020-06-10 NOTE — PROGRESS NOTES
Doing well.  Baby active.   Denies contractions, bleeding, or leakage of fluid.     Discussed:  Postdates, ligament discomfort, fetal movement    Pt strongly recommending female provider    Plan:  Next visit:   Repeat GBS PCR   US for fluid check  Dr. Luciano for delivery if I'm not available.    Return to office in 1 weeks for prenatal care and as needed.    RHEA Barton, CNM

## 2020-06-17 ENCOUNTER — PRENATAL OFFICE VISIT (OUTPATIENT)
Dept: OBGYN | Facility: OTHER | Age: 35
End: 2020-06-17
Attending: ADVANCED PRACTICE MIDWIFE
Payer: COMMERCIAL

## 2020-06-17 VITALS
DIASTOLIC BLOOD PRESSURE: 70 MMHG | HEIGHT: 68 IN | SYSTOLIC BLOOD PRESSURE: 116 MMHG | WEIGHT: 173 LBS | BODY MASS INDEX: 26.22 KG/M2

## 2020-06-17 DIAGNOSIS — Z34.83 ENCOUNTER FOR SUPERVISION OF OTHER NORMAL PREGNANCY, THIRD TRIMESTER: Primary | ICD-10-CM

## 2020-06-17 DIAGNOSIS — Z34.80 SUPERVISION OF OTHER NORMAL PREGNANCY, ANTEPARTUM: ICD-10-CM

## 2020-06-17 PROCEDURE — 87653 STREP B DNA AMP PROBE: CPT | Performed by: ADVANCED PRACTICE MIDWIFE

## 2020-06-17 PROCEDURE — 76815 OB US LIMITED FETUS(S): CPT | Performed by: ADVANCED PRACTICE MIDWIFE

## 2020-06-17 PROCEDURE — 99207 ZZC PRENATAL VISIT: CPT | Performed by: ADVANCED PRACTICE MIDWIFE

## 2020-06-17 ASSESSMENT — MIFFLIN-ST. JEOR: SCORE: 1533.22

## 2020-06-17 ASSESSMENT — PAIN SCALES - GENERAL: PAINLEVEL: NO PAIN (0)

## 2020-06-17 NOTE — PROGRESS NOTES
Doing well.  Baby active.   Denies bleeding, or leakage of fluid. Mild contractions on occasion    Discussed:  Post dates,  testing, NST, BPP, IOL, GBS, fetal movement    IOL risks, benefits, alternatives, questions answered.  Post-date risks (42 w) Increased risk of stillbirth.    US:  Cephalic.  Single vertical pocket > 2 cm    Plan:  US for fluid check  Repeat GBS screening  NST on Friday  BPP on Monday  IOL on Thursday, 20    Return to office in 1 weeks for prenatal care and as needed.    Wayne Brooks, APRN, CNM

## 2020-06-17 NOTE — PATIENT INSTRUCTIONS
Return to office in 1 week(s) for prenatal care and as needed.    If you think your bag of water is broke; have bleeding like a period; think your in labor; or are worried about your baby's movement; please call the labor and delivery unit @ [phone].    Thank you for allowing Wayne WILKERSON CNM and our OB team to participate in your care.  If you have a scheduling or an appointment question please contact [facility] Unit Coordinator at their direct line [phone].   ALL nursing questions or concerns can be directed to your OB nurse at: [phone] Darrius Brito/Tejal

## 2020-06-18 LAB
GP B STREP DNA SPEC QL NAA+PROBE: NEGATIVE
SPECIMEN SOURCE: NORMAL

## 2020-06-19 ENCOUNTER — HOSPITAL ENCOUNTER (OUTPATIENT)
Facility: HOSPITAL | Age: 35
Discharge: HOME | End: 2020-06-19
Attending: OBSTETRICS & GYNECOLOGY | Admitting: OBSTETRICS & GYNECOLOGY
Payer: COMMERCIAL

## 2020-06-19 VITALS
BODY MASS INDEX: 25.76 KG/M2 | DIASTOLIC BLOOD PRESSURE: 62 MMHG | SYSTOLIC BLOOD PRESSURE: 110 MMHG | OXYGEN SATURATION: 98 % | HEART RATE: 88 BPM | RESPIRATION RATE: 18 BRPM | WEIGHT: 170 LBS | HEIGHT: 68 IN | TEMPERATURE: 98 F

## 2020-06-19 PROCEDURE — 59025 FETAL NON-STRESS TEST: CPT

## 2020-06-19 PROCEDURE — G0463 HOSPITAL OUTPT CLINIC VISIT: HCPCS

## 2020-06-19 PROCEDURE — 59025 FETAL NON-STRESS TEST: CPT | Mod: 26 | Performed by: OBSTETRICS & GYNECOLOGY

## 2020-06-19 ASSESSMENT — MIFFLIN-ST. JEOR: SCORE: 1519.61

## 2020-06-19 NOTE — DISCHARGE INSTRUCTIONS
Discharge Instructions for Undelivered Patients    Diet:  * Drink 8 to 12 glasses of liquids (milk, juice, water) every day  * You may eat meals and snacks.    Activity:  * Count fetal kicks every day.  * Call your doctor if your baby is moving less than usual.    Call your provider if you notice:  * Swelling in your face or increased swelling in your hands or legs.  * Headaches that are not relieved by Tylenol (acetaminophen).  * Changes in your vision (blurring; seeing spots or stars).  * Nausea (sick to your stomach) and vomiting (throwing up).  * Weight gain of 5 pounds per week.  * Heartburn that doesn't go away.  * Signs of bladder infection: Pain when you urinate (use the toilet), needing to go more often or more urgently.  * The bag of graham (membrane) breaks, or you notice leaking in your underwear.  * Bright red blood in your underwear.  * Abdominal (lower belly) or stomach pain.  * Second (plus) baby: Contractions (tightenings) less than 10 minutes apart and getting stronger.  * Increase or change in vaginal discharge (note the color and amount).    [facility]: [phone]

## 2020-06-19 NOTE — PLAN OF CARE
"Letitia Whittaker        NST:  Reactive  Start: 1302  Stop: 1335    Physician: Dr. Cuadra for Wayne Brooks CNM  Reason For Test: Postdates  JONATHAN: 6/12/2020  Gestational Age: 41    Comments:  NST Reactive, denies contractions, denies vaginal bleeding.  /62 (BP Location: Right arm)   Pulse 88   Temp 98  F (36.7  C) (Oral)   Resp 18   Ht 1.727 m (5' 8\")   Wt 77.1 kg (170 lb)   LMP 09/06/2019 (Exact Date)   SpO2 98%   BMI 25.85 kg/m      Telephone all to Dr. Cuadra, patient okay to discharge.  Reviewed s/s of labor and when to call or come in.      Ita Montaño RN      "

## 2020-06-22 ENCOUNTER — IMMUNIZATION (OUTPATIENT)
Dept: FAMILY MEDICINE | Facility: OTHER | Age: 35
End: 2020-06-22
Payer: COMMERCIAL

## 2020-06-22 DIAGNOSIS — Z01.818 PREOPERATIVE EXAMINATION: Primary | ICD-10-CM

## 2020-06-22 PROCEDURE — U0003 INFECTIOUS AGENT DETECTION BY NUCLEIC ACID (DNA OR RNA); SEVERE ACUTE RESPIRATORY SYNDROME CORONAVIRUS 2 (SARS-COV-2) (CORONAVIRUS DISEASE [COVID-19]), AMPLIFIED PROBE TECHNIQUE, MAKING USE OF HIGH THROUGHPUT TECHNOLOGIES AS DESCRIBED BY CMS-2020-01-R: HCPCS | Mod: 90 | Performed by: ADVANCED PRACTICE MIDWIFE

## 2020-06-22 PROCEDURE — 99000 SPECIMEN HANDLING OFFICE-LAB: CPT | Performed by: ADVANCED PRACTICE MIDWIFE

## 2020-06-22 NOTE — PROGRESS NOTES
NST doucmentation:    Indication: post term  Duration: 30 min     41w3d  FHR baseline: 125 with moderate variability  Accelerations: y  Decelerations: n  Contractions: y    Category 1 tracing

## 2020-06-23 ENCOUNTER — HOSPITAL ENCOUNTER (OUTPATIENT)
Dept: ULTRASOUND IMAGING | Facility: HOSPITAL | Age: 35
Discharge: HOME | End: 2020-06-23
Attending: ADVANCED PRACTICE MIDWIFE | Admitting: ADVANCED PRACTICE MIDWIFE
Payer: COMMERCIAL

## 2020-06-23 DIAGNOSIS — Z34.83 ENCOUNTER FOR SUPERVISION OF OTHER NORMAL PREGNANCY, THIRD TRIMESTER: ICD-10-CM

## 2020-06-23 LAB
SARS-COV-2 RNA SPEC QL NAA+PROBE: NOT DETECTED
SPECIMEN SOURCE: NORMAL

## 2020-06-23 PROCEDURE — 76819 FETAL BIOPHYS PROFIL W/O NST: CPT | Mod: TC

## 2020-06-24 ENCOUNTER — PRENATAL OFFICE VISIT (OUTPATIENT)
Dept: OBGYN | Facility: OTHER | Age: 35
End: 2020-06-24
Attending: ADVANCED PRACTICE MIDWIFE
Payer: COMMERCIAL

## 2020-06-24 VITALS — WEIGHT: 171 LBS | SYSTOLIC BLOOD PRESSURE: 112 MMHG | BODY MASS INDEX: 26 KG/M2 | DIASTOLIC BLOOD PRESSURE: 62 MMHG

## 2020-06-24 DIAGNOSIS — Z34.80 SUPERVISION OF OTHER NORMAL PREGNANCY, ANTEPARTUM: ICD-10-CM

## 2020-06-24 DIAGNOSIS — Z34.83 ENCOUNTER FOR SUPERVISION OF OTHER NORMAL PREGNANCY, THIRD TRIMESTER: Primary | ICD-10-CM

## 2020-06-24 PROCEDURE — 99207 ZZC PRENATAL VISIT: CPT | Performed by: ADVANCED PRACTICE MIDWIFE

## 2020-06-24 ASSESSMENT — PAIN SCALES - GENERAL: PAINLEVEL: NO PAIN (0)

## 2020-06-24 NOTE — PATIENT INSTRUCTIONS
Return to office in 2 week(s) for postpartum care and as needed.    If you think your bag of water is broke; have bleeding like a period; think your in labor; or are worried about your baby's movement; please call the labor and delivery unit @ [phone].    Thank you for allowing Wayne WILKERSON CNM and our OB team to participate in your care.  If you have a scheduling or an appointment question please contact [facility] Unit Coordinator at their direct line [phone].   ALL nursing questions or concerns can be directed to your OB nurse at: [phone] Darrius Brito/Tejal

## 2020-06-24 NOTE — PROGRESS NOTES
Doing well.  Baby active.   Denies contractions, bleeding, or leakage of fluid.     Discussed:  IOL, positioning for labor and birth, diet in labor, fetal movement    SVE:  3+ cm/60 % effaced/ -2 station, middle, soft  Boyce score:  8    Plan:  IOL tomorrow a.m.    Return to office in 2 weeks for postpartum care and as needed.    Wayne Brooks, RHEA, CNM

## 2020-06-25 ENCOUNTER — HOSPITAL ENCOUNTER (INPATIENT)
Facility: HOSPITAL | Age: 35
LOS: 1 days | Discharge: HOME | End: 2020-06-26
Attending: ADVANCED PRACTICE MIDWIFE | Admitting: ADVANCED PRACTICE MIDWIFE
Payer: COMMERCIAL

## 2020-06-25 ENCOUNTER — ANESTHESIA EVENT (OUTPATIENT)
Dept: OBGYN | Facility: HOSPITAL | Age: 35
End: 2020-06-25
Payer: COMMERCIAL

## 2020-06-25 ENCOUNTER — ANESTHESIA (OUTPATIENT)
Dept: OBGYN | Facility: HOSPITAL | Age: 35
End: 2020-06-25
Payer: COMMERCIAL

## 2020-06-25 PROBLEM — Z34.90 ENCOUNTER FOR ELECTIVE INDUCTION OF LABOR: Status: ACTIVE | Noted: 2020-06-25

## 2020-06-25 PROBLEM — Z34.90 ENCOUNTER FOR INDUCTION OF LABOR: Status: RESOLVED | Noted: 2020-06-25 | Resolved: 2020-06-25

## 2020-06-25 PROBLEM — Z34.90 ENCOUNTER FOR ELECTIVE INDUCTION OF LABOR: Status: RESOLVED | Noted: 2020-06-25 | Resolved: 2020-06-25

## 2020-06-25 PROBLEM — Z34.90 ENCOUNTER FOR INDUCTION OF LABOR: Status: ACTIVE | Noted: 2020-06-25

## 2020-06-25 LAB
ABO + RH BLD: NORMAL
ABO + RH BLD: NORMAL
BLD GP AB SCN SERPL QL: NORMAL
BLOOD BANK CMNT PATIENT-IMP: NORMAL
HGB BLD-MCNC: 14.1 G/DL (ref 11.7–15.7)
PLATELET # BLD AUTO: 196 10E9/L (ref 150–450)
SPECIMEN EXP DATE BLD: NORMAL

## 2020-06-25 PROCEDURE — 86900 BLOOD TYPING SEROLOGIC ABO: CPT | Performed by: ADVANCED PRACTICE MIDWIFE

## 2020-06-25 PROCEDURE — 86901 BLOOD TYPING SEROLOGIC RH(D): CPT | Performed by: ADVANCED PRACTICE MIDWIFE

## 2020-06-25 PROCEDURE — 85018 HEMOGLOBIN: CPT | Performed by: ADVANCED PRACTICE MIDWIFE

## 2020-06-25 PROCEDURE — 25000125 ZZHC RX 250

## 2020-06-25 PROCEDURE — 25000125 ZZHC RX 250: Performed by: ADVANCED PRACTICE MIDWIFE

## 2020-06-25 PROCEDURE — 4A1HXCZ MONITORING OF PRODUCTS OF CONCEPTION, CARDIAC RATE, EXTERNAL APPROACH: ICD-10-PCS | Performed by: ADVANCED PRACTICE MIDWIFE

## 2020-06-25 PROCEDURE — 59400 OBSTETRICAL CARE: CPT | Performed by: NURSE ANESTHETIST, CERTIFIED REGISTERED

## 2020-06-25 PROCEDURE — 25000132 ZZH RX MED GY IP 250 OP 250 PS 637: Performed by: ADVANCED PRACTICE MIDWIFE

## 2020-06-25 PROCEDURE — 25800030 ZZH RX IP 258 OP 636: Performed by: ADVANCED PRACTICE MIDWIFE

## 2020-06-25 PROCEDURE — 10907ZC DRAINAGE OF AMNIOTIC FLUID, THERAPEUTIC FROM PRODUCTS OF CONCEPTION, VIA NATURAL OR ARTIFICIAL OPENING: ICD-10-PCS | Performed by: NURSE ANESTHETIST, CERTIFIED REGISTERED

## 2020-06-25 PROCEDURE — 25000128 H RX IP 250 OP 636: Performed by: NURSE ANESTHETIST, CERTIFIED REGISTERED

## 2020-06-25 PROCEDURE — 72200001 ZZH LABOR CARE VAGINAL DELIVERY SINGLE

## 2020-06-25 PROCEDURE — 36415 COLL VENOUS BLD VENIPUNCTURE: CPT | Performed by: ADVANCED PRACTICE MIDWIFE

## 2020-06-25 PROCEDURE — 85049 AUTOMATED PLATELET COUNT: CPT | Performed by: ADVANCED PRACTICE MIDWIFE

## 2020-06-25 PROCEDURE — 86850 RBC ANTIBODY SCREEN: CPT | Performed by: ADVANCED PRACTICE MIDWIFE

## 2020-06-25 PROCEDURE — 0KQM0ZZ REPAIR PERINEUM MUSCLE, OPEN APPROACH: ICD-10-PCS | Performed by: ADVANCED PRACTICE MIDWIFE

## 2020-06-25 PROCEDURE — 3E0R3BZ INTRODUCTION OF ANESTHETIC AGENT INTO SPINAL CANAL, PERCUTANEOUS APPROACH: ICD-10-PCS | Performed by: ADVANCED PRACTICE MIDWIFE

## 2020-06-25 PROCEDURE — 59400 OBSTETRICAL CARE: CPT | Performed by: ADVANCED PRACTICE MIDWIFE

## 2020-06-25 PROCEDURE — 25000125 ZZHC RX 250: Performed by: NURSE ANESTHETIST, CERTIFIED REGISTERED

## 2020-06-25 PROCEDURE — 86780 TREPONEMA PALLIDUM: CPT | Performed by: ADVANCED PRACTICE MIDWIFE

## 2020-06-25 PROCEDURE — 12000000 ZZH R&B MED SURG/OB

## 2020-06-25 RX ORDER — CARBOPROST TROMETHAMINE 250 UG/ML
250 INJECTION, SOLUTION INTRAMUSCULAR
Status: DISCONTINUED | OUTPATIENT
Start: 2020-06-25 | End: 2020-06-25

## 2020-06-25 RX ORDER — LIDOCAINE HYDROCHLORIDE 10 MG/ML
10 INJECTION, SOLUTION EPIDURAL; INFILTRATION; INTRACAUDAL; PERINEURAL ONCE
Status: DISCONTINUED | OUTPATIENT
Start: 2020-06-25 | End: 2020-06-25

## 2020-06-25 RX ORDER — FENTANYL CITRATE 50 UG/ML
INJECTION, SOLUTION INTRAMUSCULAR; INTRAVENOUS
Status: DISPENSED
Start: 2020-06-25 | End: 2020-06-26

## 2020-06-25 RX ORDER — VITAMIN A ACETATE, .BETA.-CAROTENE, ASCORBIC ACID, CHOLECALCIFEROL, .ALPHA.-TOCOPHEROL ACETATE, DL-, THIAMINE MONONITRATE, RIBOFLAVIN, NIACINAMIDE, PYRIDOXINE HYDROCHLORIDE, FOLIC ACID, CYANOCOBALAMIN, CALCIUM CARBONATE, FERROUS FUMARATE, ZINC OXIDE, AND CUPRIC OXIDE 2000; 2000; 120; 400; 22; 1.84; 3; 20; 10; 1; 12; 200; 27; 25; 2 [IU]/1; [IU]/1; MG/1; [IU]/1; MG/1; MG/1; MG/1; MG/1; MG/1; MG/1; UG/1; MG/1; MG/1; MG/1; MG/1
1 TABLET ORAL DAILY
Status: DISCONTINUED | OUTPATIENT
Start: 2020-06-26 | End: 2020-06-27 | Stop reason: HOSPADM

## 2020-06-25 RX ORDER — BUPIVACAINE HYDROCHLORIDE 2.5 MG/ML
INJECTION, SOLUTION INFILTRATION; PERINEURAL PRN
Status: DISCONTINUED | OUTPATIENT
Start: 2020-06-25 | End: 2020-06-25

## 2020-06-25 RX ORDER — IBUPROFEN 800 MG/1
800 TABLET, FILM COATED ORAL EVERY 6 HOURS PRN
Status: DISCONTINUED | OUTPATIENT
Start: 2020-06-25 | End: 2020-06-27 | Stop reason: HOSPADM

## 2020-06-25 RX ORDER — SODIUM CHLORIDE, SODIUM LACTATE, POTASSIUM CHLORIDE, CALCIUM CHLORIDE 600; 310; 30; 20 MG/100ML; MG/100ML; MG/100ML; MG/100ML
INJECTION, SOLUTION INTRAVENOUS CONTINUOUS
Status: DISCONTINUED | OUTPATIENT
Start: 2020-06-25 | End: 2020-06-25

## 2020-06-25 RX ORDER — OXYTOCIN 10 [USP'U]/ML
10 INJECTION, SOLUTION INTRAMUSCULAR; INTRAVENOUS
Status: DISCONTINUED | OUTPATIENT
Start: 2020-06-25 | End: 2020-06-27 | Stop reason: HOSPADM

## 2020-06-25 RX ORDER — AMOXICILLIN 250 MG
2 CAPSULE ORAL 2 TIMES DAILY
Status: DISCONTINUED | OUTPATIENT
Start: 2020-06-25 | End: 2020-06-27 | Stop reason: HOSPADM

## 2020-06-25 RX ORDER — METHYLERGONOVINE MALEATE 0.2 MG/ML
200 INJECTION INTRAVENOUS
Status: DISCONTINUED | OUTPATIENT
Start: 2020-06-25 | End: 2020-06-25

## 2020-06-25 RX ORDER — OXYTOCIN 10 [USP'U]/ML
10 INJECTION, SOLUTION INTRAMUSCULAR; INTRAVENOUS
Status: DISCONTINUED | OUTPATIENT
Start: 2020-06-25 | End: 2020-06-25

## 2020-06-25 RX ORDER — ONDANSETRON 2 MG/ML
4 INJECTION INTRAMUSCULAR; INTRAVENOUS EVERY 6 HOURS PRN
Status: DISCONTINUED | OUTPATIENT
Start: 2020-06-25 | End: 2020-06-25

## 2020-06-25 RX ORDER — LIDOCAINE 40 MG/G
CREAM TOPICAL
Status: DISCONTINUED | OUTPATIENT
Start: 2020-06-25 | End: 2020-06-25

## 2020-06-25 RX ORDER — EPHEDRINE SULFATE 50 MG/ML
INJECTION, SOLUTION INTRAMUSCULAR; INTRAVENOUS; SUBCUTANEOUS
Status: COMPLETED
Start: 2020-06-25 | End: 2020-06-25

## 2020-06-25 RX ORDER — EPHEDRINE SULFATE 50 MG/ML
5 INJECTION, SOLUTION INTRAMUSCULAR; INTRAVENOUS; SUBCUTANEOUS
Status: DISCONTINUED | OUTPATIENT
Start: 2020-06-25 | End: 2020-06-25

## 2020-06-25 RX ORDER — FENTANYL CITRATE 50 UG/ML
25 INJECTION, SOLUTION INTRAMUSCULAR; INTRAVENOUS ONCE
Status: DISCONTINUED | OUTPATIENT
Start: 2020-06-25 | End: 2020-06-25

## 2020-06-25 RX ORDER — LIDOCAINE 50 MG/G
OINTMENT TOPICAL DAILY PRN
Status: DISCONTINUED | OUTPATIENT
Start: 2020-06-25 | End: 2020-06-25

## 2020-06-25 RX ORDER — MODIFIED LANOLIN
OINTMENT (GRAM) TOPICAL
Status: DISCONTINUED | OUTPATIENT
Start: 2020-06-25 | End: 2020-06-27 | Stop reason: HOSPADM

## 2020-06-25 RX ORDER — OXYTOCIN/0.9 % SODIUM CHLORIDE 30/500 ML
100-340 PLASTIC BAG, INJECTION (ML) INTRAVENOUS CONTINUOUS PRN
Status: DISCONTINUED | OUTPATIENT
Start: 2020-06-25 | End: 2020-06-25

## 2020-06-25 RX ORDER — NALBUPHINE HYDROCHLORIDE 10 MG/ML
2.5-5 INJECTION, SOLUTION INTRAMUSCULAR; INTRAVENOUS; SUBCUTANEOUS EVERY 6 HOURS PRN
Status: DISCONTINUED | OUTPATIENT
Start: 2020-06-25 | End: 2020-06-25

## 2020-06-25 RX ORDER — LIDOCAINE 50 MG/G
OINTMENT TOPICAL DAILY PRN
Status: DISCONTINUED | OUTPATIENT
Start: 2020-06-25 | End: 2020-06-27 | Stop reason: HOSPADM

## 2020-06-25 RX ORDER — FENTANYL CITRATE 50 UG/ML
INJECTION, SOLUTION INTRAMUSCULAR; INTRAVENOUS PRN
Status: DISCONTINUED | OUTPATIENT
Start: 2020-06-25 | End: 2020-06-25

## 2020-06-25 RX ORDER — OXYTOCIN/0.9 % SODIUM CHLORIDE 30/500 ML
1-24 PLASTIC BAG, INJECTION (ML) INTRAVENOUS CONTINUOUS
Status: DISCONTINUED | OUTPATIENT
Start: 2020-06-25 | End: 2020-06-25

## 2020-06-25 RX ORDER — NALOXONE HYDROCHLORIDE 0.4 MG/ML
.1-.4 INJECTION, SOLUTION INTRAMUSCULAR; INTRAVENOUS; SUBCUTANEOUS
Status: DISCONTINUED | OUTPATIENT
Start: 2020-06-25 | End: 2020-06-25

## 2020-06-25 RX ORDER — BUPIVACAINE HYDROCHLORIDE 2.5 MG/ML
2 INJECTION, SOLUTION EPIDURAL; INFILTRATION; INTRACAUDAL ONCE
Status: DISCONTINUED | OUTPATIENT
Start: 2020-06-25 | End: 2020-06-25

## 2020-06-25 RX ORDER — ACETAMINOPHEN 325 MG/1
325-650 TABLET ORAL EVERY 4 HOURS PRN
Status: DISCONTINUED | OUTPATIENT
Start: 2020-06-25 | End: 2020-06-27 | Stop reason: HOSPADM

## 2020-06-25 RX ORDER — TERBUTALINE SULFATE 1 MG/ML
0.25 INJECTION, SOLUTION SUBCUTANEOUS
Status: DISCONTINUED | OUTPATIENT
Start: 2020-06-25 | End: 2020-06-25

## 2020-06-25 RX ORDER — AMOXICILLIN 250 MG
1 CAPSULE ORAL 2 TIMES DAILY
Status: DISCONTINUED | OUTPATIENT
Start: 2020-06-25 | End: 2020-06-27 | Stop reason: HOSPADM

## 2020-06-25 RX ORDER — OXYTOCIN/0.9 % SODIUM CHLORIDE 30/500 ML
340 PLASTIC BAG, INJECTION (ML) INTRAVENOUS CONTINUOUS PRN
Status: DISCONTINUED | OUTPATIENT
Start: 2020-06-25 | End: 2020-06-27 | Stop reason: HOSPADM

## 2020-06-25 RX ORDER — IBUPROFEN 800 MG/1
800 TABLET, FILM COATED ORAL
Status: DISCONTINUED | OUTPATIENT
Start: 2020-06-25 | End: 2020-06-25

## 2020-06-25 RX ADMIN — Medication 2 MILLI-UNITS/MIN: at 19:38

## 2020-06-25 RX ADMIN — MISOPROSTOL 40 MCG: 200 TABLET ORAL at 13:55

## 2020-06-25 RX ADMIN — MISOPROSTOL 20 MCG: 200 TABLET ORAL at 11:57

## 2020-06-25 RX ADMIN — SODIUM CHLORIDE, POTASSIUM CHLORIDE, SODIUM LACTATE AND CALCIUM CHLORIDE 1000 ML: 600; 310; 30; 20 INJECTION, SOLUTION INTRAVENOUS at 18:23

## 2020-06-25 RX ADMIN — MISOPROSTOL 20 MCG: 200 TABLET ORAL at 10:55

## 2020-06-25 RX ADMIN — FENTANYL CITRATE 25 MCG: 50 INJECTION, SOLUTION INTRAMUSCULAR; INTRAVENOUS at 18:47

## 2020-06-25 RX ADMIN — BUPIVACAINE HYDROCHLORIDE 2 ML: 2.5 INJECTION, SOLUTION INFILTRATION; PERINEURAL at 18:47

## 2020-06-25 RX ADMIN — MISOPROSTOL 40 MCG: 200 TABLET ORAL at 15:54

## 2020-06-25 RX ADMIN — SODIUM CHLORIDE, POTASSIUM CHLORIDE, SODIUM LACTATE AND CALCIUM CHLORIDE: 600; 310; 30; 20 INJECTION, SOLUTION INTRAVENOUS at 19:38

## 2020-06-25 RX ADMIN — MISOPROSTOL 20 MCG: 200 TABLET ORAL at 12:56

## 2020-06-25 RX ADMIN — Medication 5 MG: at 19:44

## 2020-06-25 RX ADMIN — MISOPROSTOL 40 MCG: 200 TABLET ORAL at 14:56

## 2020-06-25 RX ADMIN — LIDOCAINE: 40 CREAM TOPICAL at 20:36

## 2020-06-25 RX ADMIN — MISOPROSTOL 20 MCG: 200 TABLET ORAL at 09:56

## 2020-06-25 RX ADMIN — MISOPROSTOL 800 MCG: 200 TABLET ORAL at 20:21

## 2020-06-25 RX ADMIN — Medication 5 MG: at 19:00

## 2020-06-25 RX ADMIN — LIDOCAINE HYDROCHLORIDE 20 ML: 10; .005 INJECTION, SOLUTION EPIDURAL; INFILTRATION; INTRACAUDAL; PERINEURAL at 20:17

## 2020-06-25 ASSESSMENT — MIFFLIN-ST. JEOR: SCORE: 1524.15

## 2020-06-25 NOTE — PROGRESS NOTES
Cervix:  No exam  Fetal Heart Rate Tracing: Cat I  Contractions  2-4  Comfort level tolerating well    Assessment:   More frequent contractions    Plan:   Continue with POC    RHEA Batron, CNM

## 2020-06-25 NOTE — H&P
ADMISSION NOTE FOR Letitia Whittaker on 2020.    H and P unchanged from prenatal   Lungs: clear  Heart: RRR    ROS:  CONSTITUTIONAL: NEGATIVE for fever, chills, change in weight  RESP: NEGATIVE for significant cough or SOB  CV: NEGATIVE for chest pain, palpitations or peripheral edema  GI: NEGATIVE for nausea, abdominal pain, heartburn, or change in bowel habits  : NEGATIVE for frequency, dysuria, hematuria, vaginal discharge  PSYCHIATRIC: NEGATIVE for changes in mood or affect       Letitia Whittaker is a 34 year old female  41w6d  Estimated Date of Delivery: 2020 is calculated from Patient's last menstrual period was 2019 (exact date). [facility] 2020 at 8:59 AM  for induction of labor.  Indication post dates.     Membranes are intact     PRENATAL COURSE   Prenatal vital signs WNL   Prenatal course was essentially uncomplicated     HISTORIES   Allergies   Allergen Reactions     Milk Protein Extract GI Disturbance      No past medical history on file.   No past surgical history on file.   No family history on file.   Social History     Tobacco Use     Smoking status: Never Smoker     Smokeless tobacco: Never Used   Substance Use Topics     Alcohol use: Not on file      OB History    Para Term  AB Living   2 1 0 0 0 0   SAB TAB Ectopic Multiple Live Births   0 0 0 0 0      # Outcome Date GA Lbr Fabio/2nd Weight Sex Delivery Anes PTL Lv   2 Current            1 Para                 LABS:     Lab Results   Component Value Date    ABO B 2019           Lab Results   Component Value Date    RH Pos 2019      Rhogam not indicated   No results found for: RUBELLAABIGG   No results found for: RPR   No results found for: HIV   Lab Results   Component Value Date    HGB 12.4 2020      Lab Results   Component Value Date    HEPBANG Nonreactive 2019      Lab Results   Component Value Date    GBS Negative 2020      Other significant lab:   " None.     PHYSICAL EXAM:     /71 (BP Location: Left arm)   Pulse 91   Temp 98.1  F (36.7  C) (Oral)   Resp 16   Ht 1.727 m (5' 8\")   Wt 77.6 kg (171 lb)   LMP 09/06/2019 (Exact Date)   SpO2 97%   BMI 26.00 kg/m    Neuro: denies headache and visual disturbances   Edema none Reflexes +1     Abdomen: gravid, single vertex fetus, non-tender, Estimated Fetal Weight: 8 lb     FETAL HEART TONES Cat I  Cervix  3-4 cm/ 60% effaced/-2 station, medium, medium    ASSESSMENT:   IUP @ 41w6d for induction of labor.  Indication post dates.  NST reactive.       PLAN:   IOL  Discuss IOL options     RHEA Barton, YOLY        "

## 2020-06-25 NOTE — ANESTHESIA PROCEDURE NOTES
Procedure note : intrathecal  Staff -       CRNA: Apollo Byrne APRN CRNA    Performed By: CRNA    Referred By: DR. Smallwood    Pre-Procedure  Performed by   Referred by DR. Smallwood  Location: OB    Procedure Times:6/25/2020 6:45 PM and 6/25/2020 6:55 PM  Pre-Anesthestic Checklist: patient identified, IV checked, risks and benefits discussed, informed consent, monitors and equipment checked, pre-op evaluation, at physician/surgeon's request and post-op pain management    Timeout  Correct Patient: Yes   Correct Procedure: Yes   Correct Site: Yes   Correct Laterality: N/A   Correct Position: Yes   Site Marked: N/A   .   Procedure Documentation  ASA 2  Diagnosis:pregnant.    Procedure: intrathecal, .   Patient Position:sitting Insertion Site:L4-5  (midline approach)     Patient Prep/Sterile Barriers; povidone-iodine 7.5% surgical scrub.  .  Needle:  Spinal Needle (gauge): 25  Spinal/LP Needle Length (inches): 3 # of attempts: 1 and  # of redirects:  1 Introducer used Introducer: 20 G .        Assessment/Narrative  .  .  clear CSF fluid removed . Time Injected: 17:48  Sensory Level: T4

## 2020-06-25 NOTE — PROGRESS NOTES
Cervix:  Not checked  Fetal Heart Rate Tracing: Cat I   Contractions  Occasional.  Reports mild cramping  Comfort level tolerating well    Assessment:   Receiving Cytotec solution proctocol    Plan:   Continue with POC      RHEA Barton, YOLY

## 2020-06-25 NOTE — ANESTHESIA PREPROCEDURE EVALUATION
"Anesthesia Pre-Procedure Evaluation    Patient: Letitia Whittaker   MRN: [id number] : 1985          Preoperative Diagnosis: * No surgery found *        No past medical history on file.  No past surgical history on file.    Anesthesia Evaluation       history and physical reviewed .      No history of anesthetic complications          ROS/MED HX    ENT/Pulmonary:  - neg pulmonary ROS     Neurologic:  - neg neurologic ROS     Cardiovascular:  - neg cardiovascular ROS       METS/Exercise Tolerance:     Hematologic:         Musculoskeletal:         GI/Hepatic:  - neg GI/hepatic ROS       Renal/Genitourinary:         Endo:         Psychiatric:         Infectious Disease:         Malignancy:         Other:                     neg OB ROS            Physical Exam  Normal systems: cardiovascular, pulmonary and dental    Airway   Mallampati: II  TM distance: > 3 FB  Neck ROM: full  Mouth opening: > 3 cm    Dental     Cardiovascular       Pulmonary             Lab Results   Component Value Date    WBC 10.2 2020    HGB 14.1 2020    HCT 35.2 2020     2020       Preop Vitals  BP Readings from Last 3 Encounters:   20 114/64   20 112/62   20 110/62    Pulse Readings from Last 3 Encounters:   20 96   20 88   19 70      Resp Readings from Last 3 Encounters:   20 16   20 18    SpO2 Readings from Last 3 Encounters:   20 97%   20 98%   19 99%      Temp Readings from Last 1 Encounters:   20 98.7  F (37.1  C) (Oral)    Ht Readings from Last 1 Encounters:   20 1.727 m (5' 8\")      Wt Readings from Last 1 Encounters:   20 77.6 kg (171 lb)    Estimated body mass index is 26 kg/m  as calculated from the following:    Height as of this encounter: 1.727 m (5' 8\").    Weight as of this encounter: 77.6 kg (171 lb).       Anesthesia Plan      History & Physical Review      ASA Status:  .  OB Epidural Asa: 2        "     Postoperative Care      Consents                 Apollo Byrne, APRN CRNA

## 2020-06-25 NOTE — PROGRESS NOTES
Discussed induction of labor options including Cytotec solution, Pitocin, ROM, and Cook balloon.    Risks, benefits, alternatives, and questions answered.    Pt decided on Cytotec Solution.    RHEA Barton, SOPHIAM

## 2020-06-25 NOTE — PROGRESS NOTES
Cervix:  7-8  Fetal Heart Rate Tracing: Cat I  Contractions  2-3  Comfort level increased discomfort    Assessment:   Progressing  Requesting intrathecal    Plan:  Anesthesia notified             RHEA Barton, CNM

## 2020-06-25 NOTE — PROGRESS NOTES
Cervix:  5-6 cm/ 70% effaced/ -1 station well applied  Fetal Heart Rate Tracing: Cat I  Contractions  2-3  Comfort level tolerating well    Assessment:   Progressing well  Clear fluid    Plan:   RHEA Thompson, SOPHIAM

## 2020-06-25 NOTE — PROGRESS NOTES
Pt in tub.  Reports increased intensity and some rectal pressure.     sitting by tub.  Call light within reach.

## 2020-06-25 NOTE — PLAN OF CARE
Induction of Labor Admit Note  Letitia Whittaker  MRN: [id number]  Gestational Age: 41w6d      Letitia Whittaker presents for induction of labor for post dates.  Patient denies contractions, bleeding or ROM.    No past medical history on file.  OB Induction Plan: Completed and available in epic chart.    Wayne Brooks notified of patient's arrival and condition.      Patient is alert and oriented X 3, denies any pain. pain. Patient oriented to room, unit, hourly rounding, and plan of care. Call light within reach.  Explained admission packet with patient bill of rights brochure. Will continue to monitor and document as needed.     Inpatient nursing criteria listed below was met:    Health care directives status obtained and documented: Yes  Patient identifies a surrogate decision maker: Yes   If yes, who:Julio Contact Information: in chart  Core Measure diagnosis present:: Yes  Vaccine assessment done and vaccines ordered if appropriate. Yes  Clergy visit ordered if patient requests: N/A  Skin issues/needs documented:Yes  Isolation needs addressed, if appropriate: N/A  Fall Prevention (Med and High risk): Care plan updated, Education given and documented and signage used: N/A  Care Plan initiated: Yes  Education Documented (Reminder to educate patient if MRSA is present on admission): Yes  Education Assessment documented:Yes  Patient has discharge needs (If yes, please explain): {YES / NO:[id number]    Plan:   Physician will see patien first and decide on induction plan

## 2020-06-26 VITALS
OXYGEN SATURATION: 95 % | TEMPERATURE: 99 F | DIASTOLIC BLOOD PRESSURE: 67 MMHG | RESPIRATION RATE: 16 BRPM | WEIGHT: 171 LBS | HEIGHT: 68 IN | SYSTOLIC BLOOD PRESSURE: 117 MMHG | BODY MASS INDEX: 25.91 KG/M2 | HEART RATE: 97 BPM

## 2020-06-26 LAB
HGB BLD-MCNC: 13.1 G/DL (ref 11.7–15.7)
T PALLIDUM AB SER QL: NONREACTIVE

## 2020-06-26 PROCEDURE — 36415 COLL VENOUS BLD VENIPUNCTURE: CPT | Performed by: ADVANCED PRACTICE MIDWIFE

## 2020-06-26 PROCEDURE — 25000132 ZZH RX MED GY IP 250 OP 250 PS 637: Performed by: ADVANCED PRACTICE MIDWIFE

## 2020-06-26 PROCEDURE — 85018 HEMOGLOBIN: CPT | Performed by: ADVANCED PRACTICE MIDWIFE

## 2020-06-26 RX ORDER — IBUPROFEN 800 MG/1
800 TABLET, FILM COATED ORAL EVERY 8 HOURS PRN
Qty: 40 TABLET | Refills: 1 | Status: SHIPPED | OUTPATIENT
Start: 2020-06-26 | End: 2020-07-08

## 2020-06-26 RX ADMIN — IBUPROFEN 800 MG: 800 TABLET ORAL at 03:36

## 2020-06-26 RX ADMIN — PRENATAL VITAMINS-IRON FUMARATE 27 MG IRON-FOLIC ACID 0.8 MG TABLET 1 TABLET: at 08:15

## 2020-06-26 RX ADMIN — IBUPROFEN 800 MG: 800 TABLET ORAL at 17:31

## 2020-06-26 RX ADMIN — ACETAMINOPHEN 650 MG: 325 TABLET, FILM COATED ORAL at 08:15

## 2020-06-26 NOTE — PROGRESS NOTES
Cervix:  9+  Fetal Heart Rate Tracing: Decel @ 1858  Contractions  2-3  Comfort level denies pain    Assessment:   Decel post intrathecal  Recovery with interventions    Plan:  Ephedrine, fluid bolus, and position change       RHEA Barton, CNM

## 2020-06-26 NOTE — PLAN OF CARE
Face to face report given with opportunity to observe patient.  Report given to Belen Ely RN.    Mery Gonzalez RN  6/25/2020, 7:10 PM

## 2020-06-26 NOTE — PLAN OF CARE
Assessments as charted. B/P: 110/70, T: 99.1, P: 97, R: 16. Rates pain: 3/10 cramping. Voiding without difficulty. Fundus: Midline firm and 1-2 below. Lochia: Light. Activity: unrestricted with out pain . Infant feeding: Breast feeding going well.      LATCH Score:   Latch: 2 - Good Latch  Audible Swallowin - Spontaneous & frequent  Type of Nipple: (Breast/Nipple) 2 - Everted  Comfort: 2 - Soft, Nontender  Hold: 2 - No Assist   Total LATCH Score: 10    Postpartum breastfeeding assessment completed and education provided, see Patient Education Activity.  Items included in the education are:   proper positioning and latch  effectiveness of feeding  manual expression  handling and storing breastmilk  maintenance of breastfeeding for the first 6 months  sign/symptoms of infant feeding issues requiring referral to qualified health care provider  Postpartum care education provided, see Patient Education activity. Patient denies needs. Will monitor.  Nisa Ely RN

## 2020-06-26 NOTE — PROGRESS NOTES
Cervix:  9+  Fetal Heart Rate Tracing: Cat I  Contractions  2-3  Comfort level Denies pain    Assessment:   No cervical change    Plan:   Low-dose Pitocin as needed    RHEA Barton, CNM

## 2020-06-26 NOTE — ANESTHESIA CARE TRANSFER NOTE
Patient: Letitia Whittaker    * No procedures listed *    Diagnosis: * No pre-op diagnosis entered *  Diagnosis Additional Information: No value filed.    Anesthesia Type:   No value filed.     Note:  Airway :Room Air  Patient transferred to:Labor and Delivery  Handoff Report: Identifed the Patient, Identified the Reponsible Provider, Reviewed the pertinent medical history, Discussed the surgical course, Reviewed Intra-OP anesthesia mangement and issues during anesthesia, Set expectations for post-procedure period and Allowed opportunity for questions and acknowledgement of understanding      Vitals: (Last set prior to Anesthesia Care Transfer)              Electronically Signed By: RHEA Garcia CRNA  June 25, 2020  7:00 PM

## 2020-06-26 NOTE — LACTATION NOTE
"Initial Lactation Consultation    Letitia Whittaker                                                                                                    [id number]    Consultation Date: 2020    Reason for Lactation Referral:routine lactation assessment.    MATERNAL HISTORY   Maternal History: 2nd baby, vaginal delivery, no complications  History of Breast Surgery: No  Breast Changes During Pregnancy: Yes  Breast Feeding History: Yes,  successful, Length of Time: 2 1/2 years  Maternal Meds: see eMar    MATERNAL ASSESSMENT    Breast Size: average  Nipple Appearance - Left: intact  Nipple Appearance - Right: intact  Nipple Erectility - Left: erect with stimulation  Nipple Erectility - Right: erect with stimulation  Areolas Compressibility: soft  Nipple Size: average  Milk Supply: colostrum    INFANT ASSESSMENT    Oral Anatomy  Mouth: normal  Palate: normal  Jaw: normal    FEEDING   Feeding Time: just finished a feeding, per mom  Position: NA  Effort to Latch: awake and alert, latched easily  Results: excellent breast feed    FEEDING PLAN    Home Feeding Plan: Nurse on demand, responding to infant's feeding cues. Snuggle in skin-to-skin to learn positioning and infant cues. Rooming-in encouraged.    LACTATION COMMENTS   Anticipatory guidance provided in regard to \"baby's second night.\"    Link provided for Maximizing Milk Production at [employer] by Dr. Emily May.    Deep latch explained for proper positioning of breast in infant's mouth, maximizing milk transfer and comfort.  [place] signs of satiety reviewed.  \"Ways to know that baby is getting enough\" discussed thoroughly.  Follow-up support information provided.        __________________________________________________________________________________  BEBA LIM RN, IBCLC  2020      "

## 2020-06-26 NOTE — ANESTHESIA POSTPROCEDURE EVALUATION
Patient: Letitia Whittaker    * No procedures listed *    Diagnosis:* No pre-op diagnosis entered *  Diagnosis Additional Information: No value filed.    Anesthesia Type:  No value filed.    Note:  Anesthesia Post Evaluation    Patient location during evaluation: Bedside  Patient participation: Able to participate in evaluation but full recovery from regional anesthesia has not yet ocurrred but is anticipated to occur within 48 hours  Level of consciousness: awake and alert  Pain management: adequate  Airway patency: patent  Cardiovascular status: acceptable and stable  Respiratory status: acceptable and room air  Hydration status: acceptable  PONV: none     Anesthetic complications: None          Last vitals:  Vitals:    06/25/20 1851 06/25/20 1854 06/25/20 1855   BP: 114/64 111/64 107/61   Pulse:      Resp:      Temp:      SpO2:            Electronically Signed By: RHEA Garcia CRNA  June 25, 2020  7:00 PM

## 2020-06-26 NOTE — L&D DELIVERY NOTE
Delivery Summary    Letitia Whittaker MRN# [id number]   Age: 34 year old YOB: 1985     ASSESSMENT & PLAN:   .  Perineum tear with repair    Routine PP cares       Labor Event Times    Labor onset date:  20 Onset time:   4:30 PM   Dilation complete date:  20 Complete time:   7:59 PM   Start pushing date/time:  2020      Labor Length    1st Stage (hrs):  3 (min):  29   2nd Stage (hrs):  0 (min):  8   3rd Stage (hrs):  0 (min):  6      Labor Events     labor?:  No   steroids:  None  Labor Type:  Induction/Cervical ripening  Predominate monitoring during 1st stage:  intermittent electronic fetal monitoring, intermittent auscultation, continuous electronic fetal monitoring     Antibiotics received during labor?:  No     Rupture date/time: 20   Rupture type:  Artificial Rupture of Membranes  Fluid color:  Clear  Fluid odor:  Normal     Induction:  Misoprostol, Oxytocin  Induction date/time: 2030   Cervical ripening date/time:     Indications for induction:  Post-term Gestation     1:1 continuous labor support provided by?:  RN       Delivery/Placenta Date and Time    Delivery Date:  20 Delivery Time:   8:07 PM   Placenta Date/Time:  2020  8:13 PM     Vaginal Counts     Initial count performed by 2 team members:   Two Team Members   Mery Gonzalez, KEVIN Del Toro, RN       Needles Suture Chadwick Sponges Instruments   Initial counts 1 0 15 8   Added to count  2     Final counts 1 2 15 8   Placed during labor Accounted for at the end of labor   No NA   No NA   No NA    Final count performed by 2 team members:   Two Team Members   Cecilia Ely      Final count correct?:  Yes     Apgars    Living status:  Living   1 Minute 5 Minute 10 Minute 15 Minute 20 Minute   Skin color: 1  1       Heart rate: 2  2       Reflex irritability: 2  2       Muscle tone: 2  2       Respiratory effort: 2  2       Total: 9  9       Apgars assigned by:   "BARBARA RUIZ     Cord    Vessels:  3 Vessels Complications:  None   Cord Blood Disposition:  Lab Gases Sent?:  No      [place] Resuscitation    Methods:  None  [place] Care at Delivery:  Vaginal Delivery Note   of viable Female.  Nursery RN Barbara RUIZ  present.  Infant with spontaneous cry, to mother's abdomen, dried and stimulated. Madeleine cares provided.  Mother and baby in stable condition. Baby skin-to-skin with mom. ID bands placed on infant, mom and father.      [place] Measurements    Weight:  7 lb 13.4 oz Length:  1' 8\"      Skin to Skin and Feeding Plan    Skin to skin initiation date/time: 1841    Skin to skin with:  Mother  Skin to skin end date/time:     Breastfeeding initiated date/time:  2020  How do you plan to feed your baby:  Breastfeeding     Labor Events and Shoulder Dystocia    Fetal Tracing Prior to Delivery:  Category 1, Category 2  Fetal Tracing Comments:  decel with intrathecal  Shoulder dystocia present?:  Neg     Delivery (Maternal) (Provider to Complete) (111396)    Episiotomy:  None  Perineal lacerations:  2nd Repaired?:  Yes   Est. blood loss (mL):  250  Number of repair packets:  2     Blood Loss  Mother: Letitia Whittaker #[id number]   Start of Mother's Information    IO Blood Loss  20 1630 - 20 2125    EBL (mL) Hospital Encounter 250 mL    Total  250 mL         End of Mother's Information  Mother: Letitia Whittaker #[id number]         Delivery - Provider to Complete ([id number])    Delivering clinician:  Wayne Brooks APRN CNM  Attempted Delivery Types (Choose all that apply):  Spontaneous Vaginal Delivery  Delivery Type (Choose the 1 that will go to the Birth History):  Vaginal, Spontaneous   Other personnel:   Provider Role   Wayne Brooks APRN CNM          Placenta    Delayed Cord Clamping:  Done  Date/Time:  2020  8:13 PM  Removal:  Spontaneous  Disposition:  Hospital disposal     Anesthesia    Method:  Intrathecal   Analgesic:   BIRTH HISTORY: ANALGESIC "   FENT 2 MCG/ML-BUPIV 0.125% (USE BUPIV 0.5%) IN  ML CNR-HI         Presentation and Position    Presentation:  Vertex  Position:  Middle Occiput Anterior      Cecilia//perineumtear with repair/ Girl/ Mariana Brooks, RHEA CNM

## 2020-06-26 NOTE — PLAN OF CARE
Face to face report given with opportunity to observe patient.    Report given to KEVIN Lambert RN   6/26/2020  7:28 AM

## 2020-06-26 NOTE — PLAN OF CARE
Assessments completed as charted. B/P: 117/67, T: 99, P: 97, R: 16. Rates pain: 0/10. Voiding without difficulty. Fundus: Midline, firm. Lochia: Light. Activity: unrestricted with out pain  and normal activity. Infant feeding: Breast feeding going well.     LATCH Score:   Latch: 2 - Good Latch  Audible Swallowin - Spontaneous & frequent  Type of Nipple: (Breast/Nipple) 2 - Everted  Comfort: 2 - Soft, Nontender  Hold: 2 - No Assist   Total LATCH Score: 10    Postpartum breastfeeding assessment completed and education provided, see Patient Education Activity.  Items included in the education are:   proper positioning and latch  effectiveness of feeding  manual expression  handling and storing breastmilk  maintenance of breastfeeding for the first 6 months  sign/symptoms of infant feeding issues requiring referral to qualified health care provider  Postpartum care education provided, see Patient Education activity. Patient denies needs. Will monitor.  Faiza Mitchell RN

## 2020-06-26 NOTE — DISCHARGE SUMMARY
[facility]    Discharge Summary  Obstetrics    Date of Admission:  2020  Date of Discharge:  2020  Discharging Provider: Daryl Alan    Discharge Diagnoses    IUP 41w 6d  IOL  NVD   2020    History of Present Illness   Letitia Whittaker is a 34 year old female who presented with candidate for IOL at 41w 6d.    Hospital Course   The patient's hospital course was unremarkable.  She recovered as anticipated and experienced no post-delivery complications.  On discharge, her pain was well controlled. Vaginal bleeding is similar to peak menstrual flow.  Voiding without difficulty.  Ambulating well and tolerating a normal diet.  No fevers.  Breastfeeding well.  Infant is stable.  She was discharged on post-partum day # 1.    Post-partum hemoglobin:   Hemoglobin   Date Value Ref Range Status   2020 13.1 11.7 - 15.7 g/dL Final       Daryl Alan MD    Discharge Disposition   Discharged to home   Condition at discharge: Good    Primary Care Physician   Physician No Ref-Primary    Consultations This Hospital Stay   None    Discharge Orders      Activity    Review discharge instructions     Reason for your hospital stay    Maternity care     Discharge Instructions - Postpartum visit    PP check with Wayne next week.     Diet    Resume previous diet     Discharge Medications   Current Discharge Medication List      START taking these medications    Details   ibuprofen (ADVIL/MOTRIN) 800 MG tablet Take 1 tablet (800 mg) by mouth every 8 hours as needed for pain Take with a full meal for primary pain control.  Qty: 40 tablet, Refills: 1    Associated Diagnoses:  (spontaneous vaginal delivery)         CONTINUE these medications which have NOT CHANGED    Details   cod liver oil CAPS capsule       Prenatal Vit-Fe Fumarate-FA (PRENATAL MULTIVITAMIN W/IRON) 27-0.8 MG tablet Take 1 tablet by mouth daily      saccharomyces boulardii (FLORASTOR) 250 MG capsule Take 250 mg by mouth 2 times daily            Allergies   Allergies   Allergen Reactions     Milk Protein Extract GI Disturbance         DISCHARGE PHYSICAL EXAMINATION:    WDF in NAD  VSS, afebrile  Lungs clear  Cardiac RR  Abdomen soft, not tender, no masses, fundus at umbilicus, flow is normal  Extremities normal, trace edema  Neuro normal  Reflexes 2+, equal, symmetric  Urine output is good and clear

## 2020-06-26 NOTE — PLAN OF CARE
Received report from KEVIN Ordonez.  Pt comfortable after intrathecal.  Denies any pain at this time.

## 2020-06-27 NOTE — PLAN OF CARE
Face to face report given with opportunity to observe patient.    Report given to Jeimy Mitchell RN   6/26/2020  7:04 PM

## 2020-06-27 NOTE — PLAN OF CARE
Patient discharged at 10:31 PM via ambulation accompanied by spouse and staff. Prescriptions sent to patients preferred pharmacy. All belongings sent with patient.     Discharge instructions reviewed with patient and significant other. Listed belongings gathered and returned to patient.     Patient discharged to home.     Core Measures and Vaccines  Core Measures applicable during stay: yes  Pneumonia and Influenza given prior to discharge, if indicated: N/A    Surgical Patient   Surgical Procedures during stay: n/a  Did patient receive discharge instruction on wound care and recognition of infection symptoms? Yes    MISC  Follow up appointment made:  Yes  Home and hospital aquired medications returned to patient: N/A  Patient reports pain was well managed at discharge: Yes

## 2020-06-28 ENCOUNTER — HOSPITAL ENCOUNTER (EMERGENCY)
Facility: HOSPITAL | Age: 35
End: 2020-06-28
Payer: COMMERCIAL

## 2020-06-30 ENCOUNTER — MEDICAL CORRESPONDENCE (OUTPATIENT)
Dept: HEALTH INFORMATION MANAGEMENT | Facility: CLINIC | Age: 35
End: 2020-06-30

## 2020-07-08 ENCOUNTER — PRENATAL OFFICE VISIT (OUTPATIENT)
Dept: OBGYN | Facility: OTHER | Age: 35
End: 2020-07-08
Attending: ADVANCED PRACTICE MIDWIFE
Payer: COMMERCIAL

## 2020-07-08 VITALS
SYSTOLIC BLOOD PRESSURE: 100 MMHG | HEIGHT: 68 IN | DIASTOLIC BLOOD PRESSURE: 68 MMHG | BODY MASS INDEX: 23.34 KG/M2 | WEIGHT: 154 LBS | HEART RATE: 60 BPM

## 2020-07-08 PROCEDURE — 99207 ZZC NO CHARGE LOS: CPT | Performed by: ADVANCED PRACTICE MIDWIFE

## 2020-07-08 RX ORDER — FAMOTIDINE 20 MG
TABLET ORAL DAILY
COMMUNITY

## 2020-07-08 ASSESSMENT — PATIENT HEALTH QUESTIONNAIRE - PHQ9: SUM OF ALL RESPONSES TO PHQ QUESTIONS 1-9: 0

## 2020-07-08 ASSESSMENT — ANXIETY QUESTIONNAIRES
IF YOU CHECKED OFF ANY PROBLEMS ON THIS QUESTIONNAIRE, HOW DIFFICULT HAVE THESE PROBLEMS MADE IT FOR YOU TO DO YOUR WORK, TAKE CARE OF THINGS AT HOME, OR GET ALONG WITH OTHER PEOPLE: NOT DIFFICULT AT ALL
3. WORRYING TOO MUCH ABOUT DIFFERENT THINGS: NOT AT ALL
5. BEING SO RESTLESS THAT IT IS HARD TO SIT STILL: NOT AT ALL
7. FEELING AFRAID AS IF SOMETHING AWFUL MIGHT HAPPEN: NOT AT ALL
4. TROUBLE RELAXING: NOT AT ALL
2. NOT BEING ABLE TO STOP OR CONTROL WORRYING: NOT AT ALL
1. FEELING NERVOUS, ANXIOUS, OR ON EDGE: SEVERAL DAYS
6. BECOMING EASILY ANNOYED OR IRRITABLE: SEVERAL DAYS
GAD7 TOTAL SCORE: 2

## 2020-07-08 ASSESSMENT — MIFFLIN-ST. JEOR: SCORE: 1447.04

## 2020-07-08 ASSESSMENT — PAIN SCALES - GENERAL: PAINLEVEL: NO PAIN (0)

## 2020-07-08 NOTE — NURSING NOTE
"Chief Complaint   Patient presents with     Post Partum Exam       Initial /68   Pulse 60   Ht 1.727 m (5' 8\")   Wt 69.9 kg (154 lb)   LMP 09/06/2019 (Exact Date)   BMI 23.42 kg/m   Estimated body mass index is 23.42 kg/m  as calculated from the following:    Height as of this encounter: 1.727 m (5' 8\").    Weight as of this encounter: 69.9 kg (154 lb).  Medication Reconciliation: complete  Tejal Cochran LPN    "

## 2020-07-08 NOTE — PROGRESS NOTES
"Letitia Whittaker is a 34 year old female  /68   Pulse 60   Ht 1.727 m (5' 8\")   Wt 69.9 kg (154 lb)   LMP 09/06/2019 (Exact Date)   BMI 23.42 kg/m    Pleasant without acute distress  Breast feeding:  y        Baby name: Mariana   Spontaneous vaginal delivery: y Stitches: y  PHQ9:  0  Bleeding:  none  Vulva:  good  Family planning:  Abstinence then NFP  Other concerns:  n    Assessment:    PP 2 w  BF  Family planning:  NFP    Plan:   Continue BF  Return to office: 4 wks for prenatal care and as needed    Greater than 20 were spent with this patient on PP cares  Wayne WILKERSON, YOLY    "

## 2020-07-09 ASSESSMENT — ANXIETY QUESTIONNAIRES: GAD7 TOTAL SCORE: 2

## 2020-08-12 ENCOUNTER — PRENATAL OFFICE VISIT (OUTPATIENT)
Dept: OBGYN | Facility: OTHER | Age: 35
End: 2020-08-12
Attending: ADVANCED PRACTICE MIDWIFE
Payer: COMMERCIAL

## 2020-08-12 VITALS
HEIGHT: 68 IN | BODY MASS INDEX: 22.73 KG/M2 | WEIGHT: 150 LBS | SYSTOLIC BLOOD PRESSURE: 104 MMHG | DIASTOLIC BLOOD PRESSURE: 72 MMHG

## 2020-08-12 DIAGNOSIS — N81.89 PELVIC FLOOR WEAKNESS IN FEMALE: ICD-10-CM

## 2020-08-12 PROCEDURE — 99207 ZZC POST PARTUM EXAM: CPT | Performed by: ADVANCED PRACTICE MIDWIFE

## 2020-08-12 ASSESSMENT — ANXIETY QUESTIONNAIRES
IF YOU CHECKED OFF ANY PROBLEMS ON THIS QUESTIONNAIRE, HOW DIFFICULT HAVE THESE PROBLEMS MADE IT FOR YOU TO DO YOUR WORK, TAKE CARE OF THINGS AT HOME, OR GET ALONG WITH OTHER PEOPLE: NOT DIFFICULT AT ALL
6. BECOMING EASILY ANNOYED OR IRRITABLE: SEVERAL DAYS
7. FEELING AFRAID AS IF SOMETHING AWFUL MIGHT HAPPEN: NOT AT ALL
1. FEELING NERVOUS, ANXIOUS, OR ON EDGE: NOT AT ALL
2. NOT BEING ABLE TO STOP OR CONTROL WORRYING: NOT AT ALL
5. BEING SO RESTLESS THAT IT IS HARD TO SIT STILL: NOT AT ALL
3. WORRYING TOO MUCH ABOUT DIFFERENT THINGS: NOT AT ALL
GAD7 TOTAL SCORE: 1
4. TROUBLE RELAXING: NOT AT ALL

## 2020-08-12 ASSESSMENT — PAIN SCALES - GENERAL: PAINLEVEL: NO PAIN (0)

## 2020-08-12 ASSESSMENT — MIFFLIN-ST. JEOR: SCORE: 1423.9

## 2020-08-12 NOTE — PATIENT INSTRUCTIONS
Return to office in one year for well woman care and as needed.    Thank you for allowing Wayne WILKERSON CNM and our OB team to participate in your care.  If you have a scheduling or an appointment question please contact [facility] Unit Coordinator at their direct line [phone].   ALL nursing questions or concerns can be directed to your OB nurse at: [phone] Darrius Brito/Tejal

## 2020-08-12 NOTE — NURSING NOTE
"Chief Complaint   Patient presents with     Post Partum Exam     6 wk PP        Initial /72 (BP Location: Left arm, Patient Position: Chair, Cuff Size: Adult Regular)   Ht 1.727 m (5' 8\")   Wt 68 kg (150 lb)   LMP 09/06/2019 (Exact Date)   BMI 22.81 kg/m   Estimated body mass index is 22.81 kg/m  as calculated from the following:    Height as of this encounter: 1.727 m (5' 8\").    Weight as of this encounter: 68 kg (150 lb).  Medication Reconciliation: complete  Alisha Mendes LPN    "

## 2020-08-12 NOTE — PROGRESS NOTES
"Letitia Whittaker is a 35 year old female is 6 weeks post partum  /72 (BP Location: Left arm, Patient Position: Chair, Cuff Size: Adult Regular)   Ht 1.727 m (5' 8\")   Wt 68 kg (150 lb)   LMP 09/06/2019 (Exact Date)   BMI 22.81 kg/m    Pleasant without acute distress  Breast feeding:  y        Baby name: Mariana   Spontaneous vaginal delivery: y Stitches: y   PHQ9:  0  Bleeding:  n  Vulva:  good  Family planning:  NFP  Other concerns:  Pelvic floor strength.  Feels pressure in vagina.    Pelvic:  Vagina and vulva are normal; well healed, no discharge is noted.    Cervix: normal without lesions.    Uterus:  mobile, normal in size and shape without tenderness.  Adnexa: without masses or tenderness.    Assessment:    PP 6 weeks   BF  Family planning:  NFP  Pelvic floor strength concerns  1st degree prolapse    Plan:   Continue BF  PT referral for pelic floor strengthening    Greater than 20 were spent with this patient on routine PP care and pelvic floor concerns  Wayne Brooks, RHEA, CNM      "

## 2020-08-14 ASSESSMENT — PATIENT HEALTH QUESTIONNAIRE - PHQ9: SUM OF ALL RESPONSES TO PHQ QUESTIONS 1-9: 0

## 2020-08-15 ASSESSMENT — ANXIETY QUESTIONNAIRES: GAD7 TOTAL SCORE: 1

## 2020-08-25 ENCOUNTER — HOSPITAL ENCOUNTER (OUTPATIENT)
Dept: PHYSICAL THERAPY | Facility: HOSPITAL | Age: 35
Setting detail: THERAPIES SERIES
End: 2020-08-25
Attending: ADVANCED PRACTICE MIDWIFE
Payer: COMMERCIAL

## 2020-08-25 DIAGNOSIS — N81.89 PELVIC FLOOR WEAKNESS IN FEMALE: ICD-10-CM

## 2020-08-25 PROCEDURE — 97530 THERAPEUTIC ACTIVITIES: CPT | Mod: GP

## 2020-08-25 PROCEDURE — 97161 PT EVAL LOW COMPLEX 20 MIN: CPT | Mod: GP

## 2020-09-01 NOTE — PROGRESS NOTES
08/25/20 1400   General Information   Type of Visit Initial OP Ortho PT Evaluation   Start of Care Date 08/25/20   Referring Physician Dr Wayne Brooks APRN   Orders Evaluate and Treat   Date of Order 08/12/20   Certification Required? No   Medical Diagnosis Pelvic organ prolapse, post partum   Surgical/Medical history reviewed Yes   Precautions/Limitations no known precautions/limitations   Body Part(s)   Body Part(s) Pelvic Floor Dysfunction   Presentation and Etiology   Pertinent history of current problem (include personal factors and/or comorbidities that impact the POC) This 36 y/o female is s/p birthing baby 2 months ago. Pt is currently having vaginal pressure -like complaints, saw something bulging 4 weeks ago when looking with mirror. Pt does have difficulty with post void dribbling. Feels more pressure after long day on feet, more active.  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  Was having some constipation with difficulty emptying- started taking ground up francine seeds which seems to have resolved it.  Pt is typically a runner and regular exerciser.    Current Level of Function   Current Community Support Family/friend caregiver   Patient role/employment history [employer]   Living environment [place]   Current equipment-Gait/Locomotion None   Pelvic Floor Dysfunction Questions   Regular exercise Yes   Fluid intake-glasses/day (one glass/cup = 8oz 100 ounces    Caffeinated beverages-glasses/day 1-2   Alcoholic beverages - glasses/day under 1   Recent diet change? No  (except added francine seeds to diet for improved BM consistency)   How long can you delay the need to urinate?  several hours   How many times do you wake to urinate at night?   1-2   How often do you urinate during the day?   ?   Can you stop the flow of urine when on the toilet?  Yes   Is the volume of urine passed usually  Medium   Do you have the sensation that you need to go to the toilet?  Yes   Do you empty your  "bladder frequently, before you experience the urge to pass urine?  No   Do you have \"triggers\" that make you feel you can't wait to go to the toilet?  No   Number of bladder infections last year?  0   Frequency of bowel movements:  1x daily   Consistency of stool?  Soft formed   Do you ignore the urge to defecate?  No   Women's Health Questions   Number of pregnancies  2   Number of vaginal deliveries  2  (long labor with 1st baby, 2nd baby pushed for 10 mns )   Weight of largest baby  7 pounds 13 ounces.    Number of episiotomies  0   Pelvic Floor Dysfunction Objective Findings   Pain-pelvic dysfunction Pelvic pain  (tenderness with s/p perineal tearing from delivery)   Observation Pelvic organ prolapse in supine - stage 2, Pelvic organ prolapse in standing stage 3    Areas of Tightness Other   Abdominal Wall Scar mobility;Diastasis recti   Type of Emptying Problem postvoid dribbling   Protection needed None   Power (MMT at Levator Ani) 3+   Endurance (Up to 10 seconds as long as still 50% power) 5   Fast Twitch (Number of 1 second contractions can do in 10 seconds. Norm=7 reps) 8   Elevation (Able to lift posterior vaginal wall toward head and public bone) Present  (req cues to perform adequately)   Medications   (see chart)   Diastasis recti 1 finger width, 1 1/2  knuckle depth approx 2 inches above umblicus, able to generate tension with TA set/head lift   Scar mobility tenderness/moderate scar/tension with palpation to vaginal tear with birh locatedalong transverse perineal/   Flexibility comment PFM incrd tension in levator ani,coccyxgeus   Planned Therapy Interventions   Planned Therapy Interventions manual therapy;neuromuscular re-education;strengthening;stretching   Planned Therapy Interventions Comment HEP/educ   Planned Modality Interventions   Planned Modality Interventions Biofeedback   Planned Modality Interventions Comments as needed   Clinical Impression   Criteria for Skilled Therapeutic " Interventions Met yes, treatment indicated   PT Diagnosis Pelvic organ prolapse, constipation, diastasis recti   Influenced by the following impairments post void dribbling, pelvic organ prolapse, diastasis recti. postpartum perineal pain/scarring   Functional limitations due to impairments exercise, sexual intercourse, adls, lifting/carrying baby   Clinical Presentation Evolving/Changing   Clinical Presentation Rationale clinical judgement   Clinical Decision Making (Complexity) Low complexity   Therapy Frequency 2 times/Week  (1-2x/week)   Predicted Duration of Therapy Intervention (days/wks) 12weeks   Risk & Benefits of therapy have been explained Yes   Patient, Family & other staff in agreement with plan of care Yes   Clinical Impression Comments Pt has post partum POP with signif PFM tightness/tenderness and myofascial restriction. She will benefit from pelvic based PT to address and treat manually and with breathing, posture, and HEP instruction   Education Assessment   Barriers to Learning No barriers   ORTHO GOALS   PT Ortho Eval Goals 1;2;3   Ortho Goal 1   Goal Identifier STG1   Goal Description Pt will demonstrate indep HEP compliance.   Target Date 09/29/20   Ortho Goal 2   Goal Identifier STG 2   Goal Description Pt will have reduced pelvic organ prolapse by 1 grade through HEP, posture and breath control.    Target Date 10/27/20   Ortho Goal 3   Goal Identifier LTG   Goal Description ADLs, exercise, care of baby and toddler as well as  sexual intercourse not affected by POP, pelvic pain   Target Date 11/24/20

## 2020-09-03 ENCOUNTER — HOSPITAL ENCOUNTER (OUTPATIENT)
Dept: PHYSICAL THERAPY | Facility: HOSPITAL | Age: 35
Setting detail: THERAPIES SERIES
End: 2020-09-03
Attending: ADVANCED PRACTICE MIDWIFE
Payer: COMMERCIAL

## 2020-09-03 PROCEDURE — 97140 MANUAL THERAPY 1/> REGIONS: CPT | Mod: GP

## 2020-09-03 PROCEDURE — 97110 THERAPEUTIC EXERCISES: CPT | Mod: GP

## 2020-09-10 ENCOUNTER — HOSPITAL ENCOUNTER (OUTPATIENT)
Dept: PHYSICAL THERAPY | Facility: HOSPITAL | Age: 35
Setting detail: THERAPIES SERIES
End: 2020-09-10
Attending: ADVANCED PRACTICE MIDWIFE
Payer: COMMERCIAL

## 2020-09-10 PROCEDURE — 97110 THERAPEUTIC EXERCISES: CPT | Mod: GP

## 2020-09-10 PROCEDURE — 97140 MANUAL THERAPY 1/> REGIONS: CPT | Mod: GP

## 2020-09-17 ENCOUNTER — HOSPITAL ENCOUNTER (OUTPATIENT)
Dept: PHYSICAL THERAPY | Facility: HOSPITAL | Age: 35
Setting detail: THERAPIES SERIES
End: 2020-09-17
Attending: ADVANCED PRACTICE MIDWIFE
Payer: COMMERCIAL

## 2020-09-17 PROCEDURE — 97110 THERAPEUTIC EXERCISES: CPT | Mod: GP

## 2020-09-17 PROCEDURE — 97140 MANUAL THERAPY 1/> REGIONS: CPT | Mod: GP

## 2020-09-18 ENCOUNTER — HOSPITAL ENCOUNTER (OUTPATIENT)
Dept: PHYSICAL THERAPY | Facility: HOSPITAL | Age: 35
Setting detail: THERAPIES SERIES
End: 2020-09-18
Attending: ADVANCED PRACTICE MIDWIFE
Payer: COMMERCIAL

## 2020-09-18 PROCEDURE — 97140 MANUAL THERAPY 1/> REGIONS: CPT | Mod: GP

## 2020-09-18 NOTE — PROGRESS NOTES
09/18/20 1518   Signing Clinician's Name / Credentials   Signing clinician's name / credentials Gabrielle Xie DPT   Session Number   Session Number 5   Subjective Report   Subjective Report Patient seen [id number] for C/O for pelvic floor weakness. Shared with Mirtha Mejia, PT. Has had pubic symphysis pain, and now pulling. She had tx from a [place] trained chiropractor and that helped. She has young kids at home. She has some relief if she doesn't have to carry them around. Denies having had difficult pregnancies. Any issues she has have always been on the right side. She plays volleyball although not currently.    Objective Measures   Objective Measures Objective Measure 1   Objective Measure 1   Objective Measure Somatic dysfunction   Details Bladder-uterus tipped anteriorly and descended. Liver tipped posteriorly. (liver primary) Left sacral sulcus deep and left HARRY prominent. Leg lengths equal, pelvis level, and SI jts equally mobile.    Treatment Interventions   Interventions Therapeutic Activity   Therapeutic Activity   Therapeutic Activities: dynamic activities to improve functional performance minutes (49304) 5   Skilled Intervention ther act   Patient Response good   Treatment Detail Initiated self-massage in LLQ and RLQ   Progress Patient verbalized understanding   Manual Therapy   Manual Therapy: Mobilization, MFR, MLD, friction massage minutes (22220) 45   Skilled Intervention man ther   Patient Response good   Treatment Detail MFR direct in RUQ    Progress Post tx liver and bladder mobility WFL, sacrum WFL of mobility and alignment.   Plan   Homework self massage toward umbilicus   Plan for next session Manual therapy per re-eval   Comments   Comments Not what I expected to find today: perhaps with her recent pregnancy the liver is still taxed and congested? Maybe next time tx will center on the bladder-uterus complex per se   Total Session Time   Timed Code Treatment Minutes 45   Total  Treatment Time (sum of timed and untimed services) 45     Gabrielle Xie, DPT

## 2020-09-25 ENCOUNTER — HOSPITAL ENCOUNTER (OUTPATIENT)
Dept: PHYSICAL THERAPY | Facility: HOSPITAL | Age: 35
Setting detail: THERAPIES SERIES
End: 2020-09-25
Attending: ADVANCED PRACTICE MIDWIFE
Payer: COMMERCIAL

## 2020-09-25 PROCEDURE — 97530 THERAPEUTIC ACTIVITIES: CPT | Mod: GP,59

## 2020-09-25 PROCEDURE — 97140 MANUAL THERAPY 1/> REGIONS: CPT | Mod: GP

## 2020-10-09 ENCOUNTER — HOSPITAL ENCOUNTER (OUTPATIENT)
Dept: PHYSICAL THERAPY | Facility: HOSPITAL | Age: 35
Setting detail: THERAPIES SERIES
End: 2020-10-09
Attending: ADVANCED PRACTICE MIDWIFE
Payer: COMMERCIAL

## 2020-10-09 PROCEDURE — 999N000214 HC STATISTIC PT OUTPT VISIT

## 2020-10-22 ENCOUNTER — HOSPITAL ENCOUNTER (OUTPATIENT)
Dept: PHYSICAL THERAPY | Facility: HOSPITAL | Age: 35
Setting detail: THERAPIES SERIES
End: 2020-10-22
Attending: ADVANCED PRACTICE MIDWIFE
Payer: COMMERCIAL

## 2020-10-22 PROCEDURE — 97140 MANUAL THERAPY 1/> REGIONS: CPT | Mod: GP

## 2020-10-22 PROCEDURE — 97110 THERAPEUTIC EXERCISES: CPT | Mod: GP

## 2020-11-05 ENCOUNTER — HOSPITAL ENCOUNTER (OUTPATIENT)
Dept: PHYSICAL THERAPY | Facility: HOSPITAL | Age: 35
Setting detail: THERAPIES SERIES
End: 2020-11-05
Attending: ADVANCED PRACTICE MIDWIFE
Payer: COMMERCIAL

## 2020-11-05 PROCEDURE — 97140 MANUAL THERAPY 1/> REGIONS: CPT | Mod: GP

## 2020-11-05 PROCEDURE — 97110 THERAPEUTIC EXERCISES: CPT | Mod: GP

## 2021-01-03 ENCOUNTER — HEALTH MAINTENANCE LETTER (OUTPATIENT)
Age: 36
End: 2021-01-03

## 2021-10-10 ENCOUNTER — HEALTH MAINTENANCE LETTER (OUTPATIENT)
Age: 36
End: 2021-10-10

## 2022-09-07 NOTE — PROGRESS NOTES
Assessment & Plan     Encounter to establish care  Patient is here to establish care  She is a healthy 37 year old female with no significant past medical history  She has no acute concerns today    Counseling for travel  Patient will be traveling to [facility] in November of this year for her sister's wedding.  Sister has decided she wanted to include the fiance's family in the wedding so the wedding will be in [place] [facility] [place] [facility]  Patient is flying into [facility] [facility].  The wedding is in [facility].  She will be staying in hotels and visiting a [place] park during her stay  She would like more information on recommended immunizations.  Will need to discuss yellow fever, hepatitis A, malaria and traveler's diarrhea prophylaxis at follow-up visit on 10/03  She has decided she will not be traveling with her children      Return in about 4 weeks (around 10/6/2022) for Routine preventive.    Sigrid Escalona MD  [facility]    Subjective   Letitia is a 37 year old, presenting for the following health issues:  Establish Care      HPI         grew up here.    Has a 5 year old and 2 year old.   Patient is from [place]     Only surgical procedure is a dental implant done  Sister has asthma  Grandma lost to cancer- colon (64).   Dad's mom- dementia (late 80s) 3 years ago.      PAP- Normal PAP.    Last PAP more than 5 years ago.  No history of abnormal PAPs.  Menstruation- Regular periods.  Every 4 weeks.  Normal flow.  No clots no pain.    Patient not on birth control   is still fertile.    Tracks cervical mucus.     No allergies.  Does get upset stomach more with heavily processed cheese.    No Drugs, EtoH, and no smoking.       No diabetes, no cholesterol issues.  Paternal grandmother- cholesterol issues.    Eats healthy.  Has received COVID shots.      Sister is getting  in [place].  Shots for the kids. Wondering about the shots.  [place]- flying into [place] [facility] [place].  Getting  in [place]. Going to  [place].  Will be staying [place].  Will be using Mosquito nets and repellants.    Malaria medications?  Other shots?  Traveler's diarrhea prevention? Leaves on November 13.      Review of Systems   Constitutional, HEENT, cardiovascular, pulmonary, gi and gu systems are negative, except as otherwise noted.          Objective    /70 (BP Location: Right arm, Patient Position: Chair)   Pulse 62   Temp 98.2  F (36.8  C)   Resp 16   Ht 1.727 m (5' 8\")   Wt 63.5 kg (140 lb)   SpO2 98%   BMI 21.29 kg/m    Body mass index is 21.29 kg/m .  Physical Exam   GENERAL: healthy, alert and no distress  EYES: Eyes grossly normal to inspection, PERRL and conjunctivae and sclerae normal  HENT: ear canals and TM's normal, nose and mouth without ulcers or lesions  NECK: no adenopathy, no asymmetry, masses, or scars and thyroid normal to palpation  RESP: lungs clear to auscultation - no rales, rhonchi or wheezes  BREAST: normal without masses, tenderness or nipple discharge and no palpable axillary masses or adenopathy  CV: regular rate and rhythm, normal S1 S2, no S3 or S4, no murmur, click or rub, no peripheral edema and peripheral pulses strong  ABDOMEN: soft, nontender, no hepatosplenomegaly, no masses and bowel sounds normal  MS: no gross musculoskeletal defects noted, no edema  SKIN: no suspicious lesions or rashes  NEURO: Normal strength and tone, mentation intact and speech normal  PSYCH: mentation appears normal, affect normal/bright          "

## 2022-09-08 ENCOUNTER — OFFICE VISIT (OUTPATIENT)
Dept: FAMILY MEDICINE | Facility: OTHER | Age: 37
End: 2022-09-08
Attending: STUDENT IN AN ORGANIZED HEALTH CARE EDUCATION/TRAINING PROGRAM
Payer: COMMERCIAL

## 2022-09-08 VITALS
SYSTOLIC BLOOD PRESSURE: 110 MMHG | TEMPERATURE: 98.2 F | WEIGHT: 140 LBS | OXYGEN SATURATION: 98 % | DIASTOLIC BLOOD PRESSURE: 70 MMHG | RESPIRATION RATE: 16 BRPM | HEART RATE: 62 BPM | HEIGHT: 68 IN | BODY MASS INDEX: 21.22 KG/M2

## 2022-09-08 DIAGNOSIS — Z71.84 COUNSELING FOR TRAVEL: ICD-10-CM

## 2022-09-08 DIAGNOSIS — Z76.89 ENCOUNTER TO ESTABLISH CARE: Primary | ICD-10-CM

## 2022-09-08 PROCEDURE — 99202 OFFICE O/P NEW SF 15 MIN: CPT | Performed by: STUDENT IN AN ORGANIZED HEALTH CARE EDUCATION/TRAINING PROGRAM

## 2022-09-08 ASSESSMENT — PAIN SCALES - GENERAL: PAINLEVEL: NO PAIN (0)

## 2022-09-08 NOTE — NURSING NOTE
"Chief Complaint   Patient presents with     Establish Care       Initial /70 (BP Location: Right arm, Patient Position: Chair)   Pulse 62   Temp 98.2  F (36.8  C)   Resp 16   Ht 1.727 m (5' 8\")   Wt 63.5 kg (140 lb)   SpO2 98%   BMI 21.29 kg/m   Estimated body mass index is 21.29 kg/m  as calculated from the following:    Height as of this encounter: 1.727 m (5' 8\").    Weight as of this encounter: 63.5 kg (140 lb).  Medication Reconciliation: complete  Carissa Melchor LPN    "

## 2022-09-18 ENCOUNTER — HEALTH MAINTENANCE LETTER (OUTPATIENT)
Age: 37
End: 2022-09-18

## 2022-09-29 NOTE — PROGRESS NOTES
SUBJECTIVE:   CC: Letitia is an 37 year old who presents for preventive health visit.     Patient has been advised of split billing requirements and indicates understanding: Yes  HPI    37 year old otherwise healthy female here for physical.    She is doing well.  No health concerns. She will be traveling to [facility] next month for her sister's wedding.  She would like to update some of her immunizations.  She would like to have some medication with her just in case she gets traveler's diarrhea or bacterial sinusitis.  She would like to update her vaccines today as well.  No recent illnesses.    Not interested in the yellow fever vaccine and will not be traveling to regions that are particularly high risk for exposure for these.    Patient would like to bring malarial prophylaxis with her and did some research on Malarone.      Gyn:   PAP- due  No abnormal PAPs  Periods are normal  First day of LMP- Sept 8      Today's PHQ-2 Score:   PHQ-2 ( 1999 Pfizer) 10/3/2022   Q1: Little interest or pleasure in doing things 0   Q2: Feeling down, depressed or hopeless 0   PHQ-2 Score 0   Q1: Little interest or pleasure in doing things Not at all   Q2: Feeling down, depressed or hopeless Not at all   PHQ-2 Score 0       Abuse: Current or Past (Physical, Sexual or Emotional) - No  Do you feel safe in your environment? Yes    Have you ever done Advance Care Planning? (For example, a Health Directive, POLST, or a discussion with a medical provider or your loved ones about your wishes): No, advance care planning information given to patient to review.  Patient declined advance care planning discussion at this time.    Social History     Tobacco Use     Smoking status: Never Smoker     Smokeless tobacco: Never Used   Substance Use Topics     Alcohol use: Yes     Comment: Social       Alcohol Use 10/3/2022   Prescreen: >3 drinks/day or >7 drinks/week? No       Reviewed orders with patient.  Reviewed health maintenance and updated  "orders accordingly - No    Breast Cancer Screening:  Any new diagnosis of family breast, ovarian, or bowel cancer? No- grandma may of had colon     History of abnormal Pap smear: NO - age 30-65 PAP every 5 years with negative HPV co-testing recommended     Reviewed and updated as needed this visit by clinical staff   Tobacco  Allergies  Meds   Med Hx            Reviewed and updated as needed this visit by Provider                   History reviewed. No pertinent past medical history.   No past surgical history on file.  OB History    Para Term  AB Living   2 2 2 0 0 2   SAB IAB Ectopic Multiple Live Births   0 0 0 0 2      # Outcome Date GA Lbr Fabio/2nd Weight Sex Delivery Anes PTL Lv   2 Term 20 41w6d 03:29 / 00:08 3.555 kg (7 lb 13.4 oz) F Vag-Spont INT N PRINCESS      Name: RENAE DE LA TORRE      Apgar1: 9  Apgar5: 9   1 Term                Review of Systems  CONSTITUTIONAL: NEGATIVE for fever, chills, change in weight  INTEGUMENTARU/SKIN: NEGATIVE for worrisome rashes, moles or lesions  EYES: NEGATIVE for vision changes or irritation  ENT: NEGATIVE for ear, mouth and throat problems  RESP: NEGATIVE for significant cough or SOB  BREAST: NEGATIVE for masses, tenderness or discharge  CV: NEGATIVE for chest pain, palpitations or peripheral edema  GI: NEGATIVE for nausea, abdominal pain, heartburn, or change in bowel habits  : NEGATIVE for unusual urinary or vaginal symptoms. Periods are regular.  MUSCULOSKELETAL: NEGATIVE for significant arthralgias or myalgia  NEURO: NEGATIVE for weakness, dizziness or paresthesias  PSYCHIATRIC: NEGATIVE for changes in mood or affect     OBJECTIVE:   /65 (BP Location: Right arm, Patient Position: Chair)   Pulse 73   Temp 98.6  F (37  C)   Resp 15   Ht 1.727 m (5' 8\")   Wt 63.5 kg (140 lb)   SpO2 98%   BMI 21.29 kg/m    Physical Exam  GENERAL: healthy, alert and no distress  EYES: Eyes grossly normal to inspection, PERRL and conjunctivae and " sclerae normal  HENT: ear canals and TM's normal, nose and mouth without ulcers or lesions  NECK: no adenopathy, no asymmetry, masses, or scars and thyroid normal to palpation  RESP: lungs clear to auscultation - no rales, rhonchi or wheezes  CV: regular rate and rhythm, normal S1 S2, no S3 or S4, no murmur, click or rub, no peripheral edema and peripheral pulses strong  ABDOMEN: soft, nontender, no hepatosplenomegaly, no masses and bowel sounds normal  MS: no gross musculoskeletal defects noted, no edema  SKIN: no suspicious lesions or rashes  NEURO: Normal strength and tone, mentation intact and speech normal  PSYCH: mentation appears normal, affect normal/bright      ASSESSMENT/PLAN:   (Z23) Need for vaccination  (primary encounter diagnosis)  Comment: Needs update on Tetanus, will administer TDAP.  Will also give one dose of hepatitis A.  Repeat no sooner than 6 months  Plan: TDAP VACCINE (Adacel, Boostrix)  [[id number]],         HEPATITIS A VACCINE (ADULT)          (R11.0) Nausea  Comment: No symptoms currently, would like to have some on hand in case she needs it for travel  Plan: ondansetron (ZOFRAN ODT) 4 MG ODT tab           (A09) Traveler's diarrhea  Comment: No symptoms currently, would like to have medication on hand for travel  Plan: azithromycin (ZITHROMAX) 250 MG tablet            (J01.90,  B96.89) Acute bacterial sinusitis  Comment: No symptoms currently, would like to have some on hand for travel to [facility]  Plan: amoxicillin (AMOXIL) 875 MG tablet           (Z76.89) Routine history and physical examination of adult  Comment: Healthy, no issues.  Due for PAP today  Plan: A pap thin layer screen with  HPV - recommended        age 30 - 65 years    COUNSELING:  Reviewed preventive health counseling, as reflected in patient instructions       International travel prophylaxis       Regular exercise       Healthy diet/nutrition    Estimated body mass index is 21.29 kg/m  as calculated from the following:     "Height as of this encounter: 1.727 m (5' 8\").    Weight as of this encounter: 63.5 kg (140 lb).        She reports that she has never smoked. She has never used smokeless tobacco.      Counseling Resources:  ATP IV Guidelines  Pooled Cohorts Equation Calculator  Breast Cancer Risk Calculator  BRCA-Related Cancer Risk Assessment: FHS-7 Tool  FRAX Risk Assessment  ICSI Preventive Guidelines  Dietary Guidelines for Americans, 2010  USDA's MyPlate  ASA Prophylaxis  Lung CA Screening    Sigrid Escalona MD  [facility] - [place]  "

## 2022-10-03 ENCOUNTER — OFFICE VISIT (OUTPATIENT)
Dept: FAMILY MEDICINE | Facility: OTHER | Age: 37
End: 2022-10-03
Attending: STUDENT IN AN ORGANIZED HEALTH CARE EDUCATION/TRAINING PROGRAM
Payer: COMMERCIAL

## 2022-10-03 VITALS
TEMPERATURE: 98.6 F | HEIGHT: 68 IN | WEIGHT: 140 LBS | HEART RATE: 73 BPM | RESPIRATION RATE: 15 BRPM | OXYGEN SATURATION: 98 % | BODY MASS INDEX: 21.22 KG/M2 | SYSTOLIC BLOOD PRESSURE: 115 MMHG | DIASTOLIC BLOOD PRESSURE: 65 MMHG

## 2022-10-03 DIAGNOSIS — J01.90 ACUTE BACTERIAL SINUSITIS: ICD-10-CM

## 2022-10-03 DIAGNOSIS — R11.0 NAUSEA: ICD-10-CM

## 2022-10-03 DIAGNOSIS — B96.89 ACUTE BACTERIAL SINUSITIS: ICD-10-CM

## 2022-10-03 DIAGNOSIS — A09 TRAVELER'S DIARRHEA: ICD-10-CM

## 2022-10-03 DIAGNOSIS — Z23 NEED FOR VACCINATION: Primary | ICD-10-CM

## 2022-10-03 DIAGNOSIS — Z00.00 ROUTINE HISTORY AND PHYSICAL EXAMINATION OF ADULT: ICD-10-CM

## 2022-10-03 PROCEDURE — 90471 IMMUNIZATION ADMIN: CPT | Performed by: STUDENT IN AN ORGANIZED HEALTH CARE EDUCATION/TRAINING PROGRAM

## 2022-10-03 PROCEDURE — 90715 TDAP VACCINE 7 YRS/> IM: CPT | Performed by: STUDENT IN AN ORGANIZED HEALTH CARE EDUCATION/TRAINING PROGRAM

## 2022-10-03 PROCEDURE — 99395 PREV VISIT EST AGE 18-39: CPT | Mod: 25 | Performed by: STUDENT IN AN ORGANIZED HEALTH CARE EDUCATION/TRAINING PROGRAM

## 2022-10-03 PROCEDURE — 90632 HEPA VACCINE ADULT IM: CPT | Performed by: STUDENT IN AN ORGANIZED HEALTH CARE EDUCATION/TRAINING PROGRAM

## 2022-10-03 PROCEDURE — G0123 SCREEN CERV/VAG THIN LAYER: HCPCS | Performed by: STUDENT IN AN ORGANIZED HEALTH CARE EDUCATION/TRAINING PROGRAM

## 2022-10-03 PROCEDURE — 90472 IMMUNIZATION ADMIN EACH ADD: CPT | Performed by: STUDENT IN AN ORGANIZED HEALTH CARE EDUCATION/TRAINING PROGRAM

## 2022-10-03 PROCEDURE — 87624 HPV HI-RISK TYP POOLED RSLT: CPT | Performed by: STUDENT IN AN ORGANIZED HEALTH CARE EDUCATION/TRAINING PROGRAM

## 2022-10-03 RX ORDER — AZITHROMYCIN 250 MG/1
TABLET, FILM COATED ORAL
Qty: 6 TABLET | Refills: 0 | Status: ON HOLD | OUTPATIENT
Start: 2022-10-03 | End: 2024-04-22

## 2022-10-03 RX ORDER — ONDANSETRON 4 MG/1
4 TABLET, ORALLY DISINTEGRATING ORAL EVERY 8 HOURS PRN
Qty: 20 TABLET | Refills: 0 | Status: ON HOLD | OUTPATIENT
Start: 2022-10-03 | End: 2024-05-04

## 2022-10-03 RX ORDER — AMOXICILLIN 875 MG
875 TABLET ORAL 2 TIMES DAILY
Qty: 14 TABLET | Refills: 0 | Status: ON HOLD | OUTPATIENT
Start: 2022-10-03 | End: 2024-04-22

## 2022-10-03 ASSESSMENT — ENCOUNTER SYMPTOMS
DIARRHEA: 0
CHILLS: 0
SORE THROAT: 0
WEAKNESS: 0
DIZZINESS: 0
FEVER: 0
FREQUENCY: 0
JOINT SWELLING: 0
ARTHRALGIAS: 0
BREAST MASS: 0
HEARTBURN: 0
PALPITATIONS: 0
HEADACHES: 0
MYALGIAS: 0
SHORTNESS OF BREATH: 0
HEMATURIA: 0
DYSURIA: 0
HEMATOCHEZIA: 0
ABDOMINAL PAIN: 0
NERVOUS/ANXIOUS: 0
COUGH: 0
EYE PAIN: 0
NAUSEA: 0
CONSTIPATION: 0

## 2022-10-03 ASSESSMENT — PAIN SCALES - GENERAL: PAINLEVEL: NO PAIN (0)

## 2022-10-03 NOTE — NURSING NOTE
"Chief Complaint   Patient presents with     Physical     Imm/Inj     Flu Shot       Initial /65 (BP Location: Right arm, Patient Position: Chair)   Pulse 73   Temp 98.6  F (37  C)   Resp 15   Ht 1.727 m (5' 8\")   Wt 63.5 kg (140 lb)   SpO2 98%   BMI 21.29 kg/m   Estimated body mass index is 21.29 kg/m  as calculated from the following:    Height as of this encounter: 1.727 m (5' 8\").    Weight as of this encounter: 63.5 kg (140 lb).  Medication Reconciliation: complete  Carissa Melchor LPN    "

## 2022-10-06 LAB
BKR LAB AP GYN ADEQUACY: NORMAL
BKR LAB AP GYN INTERPRETATION: NORMAL
BKR LAB AP HPV REFLEX: NORMAL
BKR LAB AP PREVIOUS ABNORMAL: NORMAL
PATH REPORT.COMMENTS IMP SPEC: NORMAL
PATH REPORT.COMMENTS IMP SPEC: NORMAL
PATH REPORT.RELEVANT HX SPEC: NORMAL

## 2022-10-10 LAB
HUMAN PAPILLOMA VIRUS 16 DNA: NEGATIVE
HUMAN PAPILLOMA VIRUS 18 DNA: NEGATIVE
HUMAN PAPILLOMA VIRUS FINAL DIAGNOSIS: NORMAL
HUMAN PAPILLOMA VIRUS OTHER HR: NEGATIVE

## 2022-10-27 DIAGNOSIS — Z29.89 NEED FOR MALARIA PROPHYLAXIS: Primary | ICD-10-CM

## 2022-10-27 RX ORDER — ATOVAQUONE AND PROGUANIL HYDROCHLORIDE 250; 100 MG/1; MG/1
1 TABLET, FILM COATED ORAL DAILY
Qty: 28 TABLET | Refills: 0 | Status: ON HOLD | OUTPATIENT
Start: 2022-10-27 | End: 2024-04-22

## 2023-09-25 ENCOUNTER — TELEPHONE (OUTPATIENT)
Dept: FAMILY MEDICINE | Facility: OTHER | Age: 38
End: 2023-09-25

## 2023-09-25 NOTE — TELEPHONE ENCOUNTER
To: Nurse to Ginette Hampton    Patient needed to reschedule her physical appointment she had set on 11/16 with Iqra.  Dr. Escalona did not have physical opening (as she needed this appointment BEFORE Dec 2023) she will see Memo on 11/20/23. Requesting lab orders as she would like them done as well.  Any questions please return her call @ [phone]     Thank you

## 2023-11-20 ENCOUNTER — TRANSFERRED RECORDS (OUTPATIENT)
Dept: HEALTH INFORMATION MANAGEMENT | Facility: HOSPITAL | Age: 38
End: 2023-11-20

## 2023-11-20 ENCOUNTER — OFFICE VISIT (OUTPATIENT)
Dept: FAMILY MEDICINE | Facility: OTHER | Age: 38
End: 2023-11-20
Attending: NURSE PRACTITIONER
Payer: COMMERCIAL

## 2023-11-20 VITALS
OXYGEN SATURATION: 98 % | BODY MASS INDEX: 24.1 KG/M2 | TEMPERATURE: 99.3 F | DIASTOLIC BLOOD PRESSURE: 60 MMHG | HEART RATE: 80 BPM | HEIGHT: 68 IN | WEIGHT: 159 LBS | SYSTOLIC BLOOD PRESSURE: 100 MMHG

## 2023-11-20 DIAGNOSIS — Z11.59 NEED FOR HEPATITIS C SCREENING TEST: Primary | ICD-10-CM

## 2023-11-20 DIAGNOSIS — Z00.00 ROUTINE GENERAL MEDICAL EXAMINATION AT A HEALTH CARE FACILITY: ICD-10-CM

## 2023-11-20 DIAGNOSIS — Z13.220 LIPID SCREENING: ICD-10-CM

## 2023-11-20 PROBLEM — O09.521 MULTIGRAVIDA OF ADVANCED MATERNAL AGE IN FIRST TRIMESTER: Status: ACTIVE | Noted: 2023-10-05

## 2023-11-20 PROBLEM — Z32.01 POSITIVE PREGNANCY TEST: Status: ACTIVE | Noted: 2023-08-11

## 2023-11-20 LAB
ANION GAP SERPL CALCULATED.3IONS-SCNC: 9 MMOL/L (ref 7–15)
BUN SERPL-MCNC: 9.7 MG/DL (ref 6–20)
CALCIUM SERPL-MCNC: 9 MG/DL (ref 8.6–10)
CHLORIDE SERPL-SCNC: 103 MMOL/L (ref 98–107)
CHOLEST SERPL-MCNC: 177 MG/DL
CREAT SERPL-MCNC: 0.51 MG/DL (ref 0.51–0.95)
DEPRECATED HCO3 PLAS-SCNC: 24 MMOL/L (ref 22–29)
EGFRCR SERPLBLD CKD-EPI 2021: >90 ML/MIN/1.73M2
GLUCOSE SERPL-MCNC: 108 MG/DL (ref 70–99)
HDLC SERPL-MCNC: 65 MG/DL
LDLC SERPL CALC-MCNC: 90 MG/DL
NONHDLC SERPL-MCNC: 112 MG/DL
POTASSIUM SERPL-SCNC: 3.6 MMOL/L (ref 3.4–5.3)
SODIUM SERPL-SCNC: 136 MMOL/L (ref 135–145)
TRIGL SERPL-MCNC: 108 MG/DL

## 2023-11-20 PROCEDURE — 99395 PREV VISIT EST AGE 18-39: CPT | Performed by: NURSE PRACTITIONER

## 2023-11-20 PROCEDURE — 36415 COLL VENOUS BLD VENIPUNCTURE: CPT | Performed by: NURSE PRACTITIONER

## 2023-11-20 PROCEDURE — 80048 BASIC METABOLIC PNL TOTAL CA: CPT | Performed by: NURSE PRACTITIONER

## 2023-11-20 PROCEDURE — 80061 LIPID PANEL: CPT | Performed by: NURSE PRACTITIONER

## 2023-11-20 ASSESSMENT — ENCOUNTER SYMPTOMS
SORE THROAT: 0
DIZZINESS: 0
JOINT SWELLING: 0
BREAST MASS: 0
FEVER: 0
HEMATURIA: 0
HEARTBURN: 0
PALPITATIONS: 0
MYALGIAS: 0
WEAKNESS: 0
EYE PAIN: 0
HEMATOCHEZIA: 0
ARTHRALGIAS: 0
SHORTNESS OF BREATH: 0
CHILLS: 0
HEADACHES: 0
ABDOMINAL PAIN: 0
COUGH: 0
CONSTIPATION: 0
NAUSEA: 0
NERVOUS/ANXIOUS: 0
DYSURIA: 0
DIARRHEA: 0
PARESTHESIAS: 0
FREQUENCY: 0

## 2023-11-20 ASSESSMENT — PAIN SCALES - GENERAL: PAINLEVEL: NO PAIN (0)

## 2023-11-20 NOTE — PROGRESS NOTES
SUBJECTIVE:   Letitia is a 38 year old, presenting for the following:  Physical        2023     2:58 PM   Additional Questions   Roomed by Leighton Gardiner CMA   Accompanied by Self         2023     2:58 PM   Patient Reported Additional Medications   Patient reports taking the following new medications None       Healthy Habits:     Getting at least 3 servings of Calcium per day:  Yes    Bi-annual eye exam:  Yes    Dental care twice a year:  Yes    Sleep apnea or symptoms of sleep apnea:  None    Diet:  Regular (no restrictions)    Frequency of exercise:  4-5 days/week    Duration of exercise:  30-45 minutes    Taking medications regularly:  Yes    Medication side effects:  Not applicable    Additional concerns today:  No    Currently 18-19 weeks pregnant following with Dr Luciano       Social History     Tobacco Use    Smoking status: Never     Passive exposure: Never    Smokeless tobacco: Never   Substance Use Topics    Alcohol use: Yes     Comment: Social             2023     1:36 PM   Alcohol Use   Prescreen: >3 drinks/day or >7 drinks/week? No          No data to display              Reviewed orders with patient.  Reviewed health maintenance and updated orders accordingly - Yes  Lab work is in process  BP Readings from Last 3 Encounters:   23 100/60   10/03/22 115/65   22 110/70    Wt Readings from Last 3 Encounters:   23 72.1 kg (159 lb)   10/03/22 63.5 kg (140 lb)   22 63.5 kg (140 lb)                  Patient Active Problem List   Diagnosis    Supervision of other normal pregnancy, antepartum    Routine postpartum follow-up     (spontaneous vaginal delivery)    Multigravida of advanced maternal age in first trimester    Positive pregnancy test     No past surgical history on file.    Social History     Tobacco Use    Smoking status: Never     Passive exposure: Never    Smokeless tobacco: Never   Substance Use Topics    Alcohol use: Yes     Comment: Social      No family history on file.      Current Outpatient Medications   Medication Sig Dispense Refill    Prenatal Vit-Fe Fumarate-FA (PRENATAL MULTIVITAMIN W/IRON) 27-0.8 MG tablet Take 1 tablet by mouth daily      amoxicillin (AMOXIL) 875 MG tablet Take 1 tablet (875 mg) by mouth 2 times daily 14 tablet 0    atovaquone-proguanil (MALARONE) 250-100 MG tablet Take 1 tablet by mouth daily Start 2 days before travel and continue 7 days after return. 28 tablet 0    azithromycin (ZITHROMAX) 250 MG tablet Take 2 tablets by mouth daily for 3 days 6 tablet 0    cod liver oil CAPS capsule  (Patient not taking: Reported on 11/20/2023)      ondansetron (ZOFRAN ODT) 4 MG ODT tab Take 1 tablet (4 mg) by mouth every 8 hours as needed for nausea 20 tablet 0    Vitamin D, Cholecalciferol, 25 MCG (1000 UT) CAPS Take by mouth daily (Patient not taking: Reported on 11/20/2023)       Allergies   Allergen Reactions    Dairy Products [Milk Protein] GI Disturbance       Breast Cancer Screening:        10/3/2022     8:03 AM   Breast CA Risk Assessment (FHS-7)   Do you have a family history of breast, colon, or ovarian cancer? No / Unknown       click delete button to remove this line now  Patient under 40 years of age: Routine Mammogram Screening not recommended.   Pertinent mammograms are reviewed under the imaging tab.    History of abnormal Pap smear:       Latest Ref Rng & Units 10/3/2022     9:00 AM   PAP / HPV   PAP  Negative for Intraepithelial Lesion or Malignancy (NILM)    HPV 16 DNA Negative Negative    HPV 18 DNA Negative Negative    Other HR HPV Negative Negative      Reviewed and updated as needed this visit by clinical staff   Tobacco  Allergies  Meds              Reviewed and updated as needed this visit by Provider                     Review of Systems   Constitutional:  Negative for chills and fever.   HENT:  Negative for congestion, ear pain, hearing loss and sore throat.    Eyes:  Negative for pain and visual disturbance.  "  Respiratory:  Negative for cough and shortness of breath.    Cardiovascular:  Negative for chest pain, palpitations and peripheral edema.   Gastrointestinal:  Negative for abdominal pain, constipation, diarrhea, heartburn, hematochezia and nausea.   Breasts:  Positive for tenderness. Negative for breast mass and discharge.   Genitourinary:  Negative for dysuria, frequency, genital sores, hematuria, pelvic pain, urgency, vaginal bleeding and vaginal discharge.   Musculoskeletal:  Negative for arthralgias, joint swelling and myalgias.   Skin:  Negative for rash.   Neurological:  Negative for dizziness, weakness, headaches and paresthesias.   Psychiatric/Behavioral:  Negative for mood changes. The patient is not nervous/anxious.      CONSTITUTIONAL: NEGATIVE for fever, chills, change in weight  INTEGUMENTARU/SKIN: NEGATIVE for worrisome rashes, moles or lesions  EYES: NEGATIVE for vision changes or irritation  ENT: NEGATIVE for ear, mouth and throat problems  RESP: NEGATIVE for significant cough or SOB  BREAST: NEGATIVE for masses, tenderness or discharge  CV: NEGATIVE for chest pain, palpitations or peripheral edema  GI: NEGATIVE for nausea, abdominal pain, heartburn, or change in bowel habits   female: currently pregnant, denies dysuria   MUSCULOSKELETAL: NEGATIVE for significant arthralgias or myalgia  NEURO: NEGATIVE for weakness, dizziness or paresthesias  ENDOCRINE: NEGATIVE for temperature intolerance, skin/hair changes  PSYCHIATRIC: NEGATIVE for changes in mood or affect     OBJECTIVE:   /60   Pulse 80   Temp 99.3  F (37.4  C) (Tympanic)   Ht 1.727 m (5' 8\")   Wt 72.1 kg (159 lb)   SpO2 98%   BMI 24.18 kg/m    Physical Exam  GENERAL: healthy, alert and no distress  EYES: Eyes grossly normal to inspection, PERRL and conjunctivae and sclerae normal  HENT: ear canals and TM's normal, nose and mouth without ulcers or lesions  NECK: no adenopathy, no asymmetry, masses, or scars and thyroid normal to " palpation  RESP: lungs clear to auscultation - no rales, rhonchi or wheezes  CV: regular rate and rhythm, normal S1 S2, no S3 or S4, no murmur, click or rub, no peripheral edema and peripheral pulses strong  ABDOMEN: soft, nontender, no hepatosplenomegaly, no masses and bowel sounds normal  MS: no gross musculoskeletal defects noted, no edema  SKIN: no suspicious lesions or rashes  NEURO: Normal strength and tone, mentation intact and speech normal  PSYCH: mentation appears normal, affect normal/bright  LYMPH: normal ant/post cervical, supraclavicular nodes    Diagnostic Test Results:  Labs reviewed in Epic  Results for orders placed or performed in visit on 11/20/23 (from the past 24 hour(s))   Lipid Profile (Chol, Trig, HDL, LDL calc)   Result Value Ref Range    Cholesterol 177 <200 mg/dL    Triglycerides 108 <150 mg/dL    Direct Measure HDL 65 >=50 mg/dL    LDL Cholesterol Calculated 90 <=100 mg/dL    Non HDL Cholesterol 112 <130 mg/dL    Narrative    Cholesterol  Desirable:  <200 mg/dL    Triglycerides  Normal:  Less than 150 mg/dL  Borderline High:  150-199 mg/dL  High:  200-499 mg/dL  Very High:  Greater than or equal to 500 mg/dL    Direct Measure HDL  Female:  Greater than or equal to 50 mg/dL   Male:  Greater than or equal to 40 mg/dL    LDL Cholesterol  Desirable:  <100mg/dL  Above Desirable:  100-129 mg/dL   Borderline High:  130-159 mg/dL   High:  160-189 mg/dL   Very High:  >= 190 mg/dL    Non HDL Cholesterol  Desirable:  130 mg/dL  Above Desirable:  130-159 mg/dL  Borderline High:  160-189 mg/dL  High:  190-219 mg/dL  Very High:  Greater than or equal to 220 mg/dL   Basic metabolic panel   Result Value Ref Range    Sodium 136 135 - 145 mmol/L    Potassium 3.6 3.4 - 5.3 mmol/L    Chloride 103 98 - 107 mmol/L    Carbon Dioxide (CO2) 24 22 - 29 mmol/L    Anion Gap 9 7 - 15 mmol/L    Urea Nitrogen 9.7 6.0 - 20.0 mg/dL    Creatinine 0.51 0.51 - 0.95 mg/dL    GFR Estimate >90 >60 mL/min/1.73m2    Calcium 9.0  8.6 - 10.0 mg/dL    Glucose 108 (H) 70 - 99 mg/dL       ASSESSMENT/PLAN:   (Z11.59) Need for hepatitis C screening test  (primary encounter diagnosis)  Comment: declined screening       (Z00.00) Routine general medical examination at a health care facility  Comment: continue yearly physicals   Needed some labs for work - glucose slightly elevated, but not fasting and not concerning for diabetes or prediabetes  Plan: Basic metabolic panel            (Z13.220) Lipid screening  Comment: needed screening for work - no treatment indicated at this time   Plan: Lipid Profile (Chol, Trig, HDL, LDL calc)            Patient has been advised of split billing requirements and indicates understanding: Yes      COUNSELING:  Reviewed preventive health counseling, as reflected in patient instructions  Special attention given to:        Stay hydrated        Regular exercise       Healthy diet/nutrition        She reports that she has never smoked. She has never been exposed to tobacco smoke. She has never used smokeless tobacco.          RHEA Burr CNP  [facility] - [place]

## 2024-04-22 ENCOUNTER — HOSPITAL ENCOUNTER (OUTPATIENT)
Facility: HOSPITAL | Age: 39
Discharge: HOME | End: 2024-04-22
Attending: FAMILY MEDICINE | Admitting: FAMILY MEDICINE
Payer: COMMERCIAL

## 2024-04-22 VITALS
OXYGEN SATURATION: 96 % | WEIGHT: 176.59 LBS | RESPIRATION RATE: 14 BRPM | SYSTOLIC BLOOD PRESSURE: 120 MMHG | TEMPERATURE: 97.8 F | DIASTOLIC BLOOD PRESSURE: 65 MMHG | HEART RATE: 84 BPM | BODY MASS INDEX: 26.76 KG/M2 | HEIGHT: 68 IN

## 2024-04-22 PROCEDURE — 59025 FETAL NON-STRESS TEST: CPT | Performed by: FAMILY MEDICINE

## 2024-04-22 ASSESSMENT — ACTIVITIES OF DAILY LIVING (ADL): ADLS_ACUITY_SCORE: 35

## 2024-04-22 NOTE — PLAN OF CARE
Letitia MATTSON Whittaker        NST:reactive  Start:1022  Stop:1055    Physician:Dr. Luciano  Reason For Test:post dates  Estimated Date of Delivery: Apr 20, 2024   Gestational Age: 40w2d     Verified with second RN:Faiza Mitchell RN    Comments:Pt here for NST following clinic appt for post dates. Educated on test and placed on the monitor. VSS. NST reactive and verified with second RN. Dr. Luciano paged and updated on reactive strip. MD gave order for discharge home. AVS reviewed and states understanding. Pt discharged to home ind.       Mery Gonzalez, RN

## 2024-04-22 NOTE — DISCHARGE INSTRUCTIONS
Learning About When to Call Your Doctor During Pregnancy (After 20 Weeks)  Overview  It's common to have concerns about what might be a problem when you're pregnant. Most pregnancies don't have any serious problems. But it's still important to know when to call your doctor if you have certain symptoms or signs of labor.  These are general suggestions. Your doctor may give you some more information about when to call.  When to call your doctor (after 20 weeks)  Call 911  anytime you think you may need emergency care. For example, call if:  You have severe vaginal bleeding. This means you are soaking through a pad each hour for 2 or more hours.  You have sudden, severe pain in your belly.  You have chest pain, are short of breath, or cough up blood.  You passed out (lost consciousness).  You have a seizure.  You see or feel the umbilical cord.  You think you are about to deliver your baby and can't make it safely to the hospital or birthing center.  Call your doctor now or seek immediate medical care if:  You have vaginal bleeding.  You have belly pain.  You have a fever.  You are dizzy or lightheaded, or you feel like you may faint.  You have signs of a blood clot in your leg (called a deep vein thrombosis), such as:  Pain in the calf, back of the knee, thigh, or groin.  Swelling in your leg or groin.  A color change on the leg or groin. The skin may be reddish or purplish, depending on your usual skin color.  You have symptoms of preeclampsia, such as:  Sudden swelling of your face, hands, or feet.  New vision problems (such as dimness, blurring, or seeing spots).  A severe headache.  You have a sudden release of fluid from your vagina. (You think your water broke.)  You've been having regular contractions for an hour. This means that you've had at least 6 contractions within 1 hour, even after you change your position and drink fluids.  You notice that your baby has stopped moving or is moving less than  "normal.  You have signs of heart failure, such as:  New or increased shortness of breath.  New or worse swelling in your legs, ankles, or feet.  Sudden weight gain, such as more than 2 to 3 pounds in a day or 5 pounds in a week.  Feeling so tired or weak that you cannot do your usual activities.  You have symptoms of a urinary tract infection. These may include:  Pain or burning when you urinate.  A frequent need to urinate without being able to pass much urine.  Pain in the flank, which is just below the rib cage and above the waist on either side of the back.  Blood in your urine.  Watch closely for changes in your health, and be sure to contact your doctor if:  You have vaginal discharge that smells bad.  You feel sad, anxious, or hopeless for more than a few days.  You have skin changes, such as a rash, itching, or a yellow color to your skin.  You have other concerns about your pregnancy.  If you have labor signs at 37 weeks or more  If you have signs of labor at 37 weeks or more, your doctor may tell you to call when your labor becomes more active. Symptoms of active labor include:  Contractions that are regular.  Contractions that are less than 5 minutes apart.  Contractions that are hard to talk through.  Follow-up care is a key part of your treatment and safety. Be sure to make and go to all appointments, and call your doctor if you are having problems. It's also a good idea to know your test results and keep a list of the medicines you take.  Where can you learn more?  Go to https://www.expresscoin.net/patiented  Enter N531 in the search box to learn more about \"Learning About When to Call Your Doctor During Pregnancy (After 20 Weeks).\"  Current as of: July 10, 2023               Content Version: 14.0    7879-9955 [employer].   Care instructions adapted under license by your healthcare professional. If you have questions about a medical condition or this instruction, always ask your healthcare " professional. [employer] disclaims any warranty or liability for your use of this information.

## 2024-04-27 NOTE — PROGRESS NOTES
Fetal Non-Stress Test Results    NST Ordered By: Ross Luciano MD   NST Medical Indication: post dates    NST Start & Stop Times  NST Start Time: 1022  NST Stop Time: 1055                            NST Results  Fetus A   Baseline Rate: 125  Accelerations: Present  Decelerations: None  Interpretation: reactive

## 2024-04-30 ENCOUNTER — TRANSFERRED RECORDS (OUTPATIENT)
Dept: HEALTH INFORMATION MANAGEMENT | Facility: HOSPITAL | Age: 39
End: 2024-04-30

## 2024-05-01 ENCOUNTER — HOSPITAL ENCOUNTER (OUTPATIENT)
Facility: HOSPITAL | Age: 39
Discharge: HOME | End: 2024-05-01
Attending: FAMILY MEDICINE | Admitting: FAMILY MEDICINE
Payer: COMMERCIAL

## 2024-05-01 VITALS
HEART RATE: 100 BPM | DIASTOLIC BLOOD PRESSURE: 69 MMHG | WEIGHT: 177.03 LBS | HEIGHT: 68 IN | RESPIRATION RATE: 16 BRPM | TEMPERATURE: 97.4 F | SYSTOLIC BLOOD PRESSURE: 109 MMHG | BODY MASS INDEX: 26.83 KG/M2 | OXYGEN SATURATION: 96 %

## 2024-05-01 PROCEDURE — 59025 FETAL NON-STRESS TEST: CPT

## 2024-05-01 ASSESSMENT — ACTIVITIES OF DAILY LIVING (ADL)
ADLS_ACUITY_SCORE: 35
ADLS_ACUITY_SCORE: 35

## 2024-05-01 NOTE — PLAN OF CARE
Letitia Whittaker        NST:  reactive  Start: 0810  Stop: 0840    Physician: Dr. Luciano  Reason For Test: Postdates  Estimated Date of Delivery: Apr 20, 2024   Gestational Age: 41w4d     Verified with second RN:Nisa Mcclellan RN    Comments:Pt here for scheduled NST for post dates. Educated on test and placed on monitor. VSS. NST reactive and second RN verified. Dr. Luciano paged and updated on reactive strip. Discharge orders received. AVS reviewed and states understanding. Discussed with pt on information for scheduled induction 5/3. Informed on arrival time and to report to OB for induction. Discussed visiting hours and guidelines. States understanding and denies any questions or concerns.       Mery Gonzalez, RN

## 2024-05-01 NOTE — DISCHARGE INSTRUCTIONS
Learning About When to Call Your Doctor During Pregnancy (After 20 Weeks)  Overview  It's common to have concerns about what might be a problem when you're pregnant. Most pregnancies don't have any serious problems. But it's still important to know when to call your doctor if you have certain symptoms or signs of labor.  These are general suggestions. Your doctor may give you some more information about when to call.  When to call your doctor (after 20 weeks)  Call 911  anytime you think you may need emergency care. For example, call if:  You have severe vaginal bleeding. This means you are soaking through a pad each hour for 2 or more hours.  You have sudden, severe pain in your belly.  You have chest pain, are short of breath, or cough up blood.  You passed out (lost consciousness).  You have a seizure.  You see or feel the umbilical cord.  You think you are about to deliver your baby and can't make it safely to the hospital or birthing center.  Call your doctor now or seek immediate medical care if:  You have vaginal bleeding.  You have belly pain.  You have a fever.  You are dizzy or lightheaded, or you feel like you may faint.  You have signs of a blood clot in your leg (called a deep vein thrombosis), such as:  Pain in the calf, back of the knee, thigh, or groin.  Swelling in your leg or groin.  A color change on the leg or groin. The skin may be reddish or purplish, depending on your usual skin color.  You have symptoms of preeclampsia, such as:  Sudden swelling of your face, hands, or feet.  New vision problems (such as dimness, blurring, or seeing spots).  A severe headache.  You have a sudden release of fluid from your vagina. (You think your water broke.)  You've been having regular contractions for an hour. This means that you've had at least 6 contractions within 1 hour, even after you change your position and drink fluids.  You notice that your baby has stopped moving or is moving less than  "normal.  You have signs of heart failure, such as:  New or increased shortness of breath.  New or worse swelling in your legs, ankles, or feet.  Sudden weight gain, such as more than 2 to 3 pounds in a day or 5 pounds in a week.  Feeling so tired or weak that you cannot do your usual activities.  You have symptoms of a urinary tract infection. These may include:  Pain or burning when you urinate.  A frequent need to urinate without being able to pass much urine.  Pain in the flank, which is just below the rib cage and above the waist on either side of the back.  Blood in your urine.  Watch closely for changes in your health, and be sure to contact your doctor if:  You have vaginal discharge that smells bad.  You feel sad, anxious, or hopeless for more than a few days.  You have skin changes, such as a rash, itching, or a yellow color to your skin.  You have other concerns about your pregnancy.  If you have labor signs at 37 weeks or more  If you have signs of labor at 37 weeks or more, your doctor may tell you to call when your labor becomes more active. Symptoms of active labor include:  Contractions that are regular.  Contractions that are less than 5 minutes apart.  Contractions that are hard to talk through.  Follow-up care is a key part of your treatment and safety. Be sure to make and go to all appointments, and call your doctor if you are having problems. It's also a good idea to know your test results and keep a list of the medicines you take.  Where can you learn more?  Go to https://www.[employer].net/patiented  Enter N531 in the search box to learn more about \"Learning About When to Call Your Doctor During Pregnancy (After 20 Weeks).\"  Current as of: July 10, 2023               Content Version: 14.0    1736-4243 [employer].   Care instructions adapted under license by your healthcare professional. If you have questions about a medical condition or this instruction, always ask your healthcare " professional. [employer] disclaims any warranty or liability for your use of this information.

## 2024-05-02 RX ORDER — CITRIC ACID/SODIUM CITRATE 334-500MG
30 SOLUTION, ORAL ORAL
Status: CANCELLED | OUTPATIENT
Start: 2024-05-02

## 2024-05-02 RX ORDER — OXYTOCIN 10 [USP'U]/ML
10 INJECTION, SOLUTION INTRAMUSCULAR; INTRAVENOUS
Status: CANCELLED | OUTPATIENT
Start: 2024-05-02

## 2024-05-02 RX ORDER — NALOXONE HYDROCHLORIDE 0.4 MG/ML
0.2 INJECTION, SOLUTION INTRAMUSCULAR; INTRAVENOUS; SUBCUTANEOUS
Status: CANCELLED | OUTPATIENT
Start: 2024-05-02

## 2024-05-02 RX ORDER — MISOPROSTOL 200 UG/1
800 TABLET ORAL
Status: CANCELLED | OUTPATIENT
Start: 2024-05-02

## 2024-05-02 RX ORDER — KETOROLAC TROMETHAMINE 30 MG/ML
30 INJECTION, SOLUTION INTRAMUSCULAR; INTRAVENOUS
Status: CANCELLED | OUTPATIENT
Start: 2024-05-02 | End: 2024-05-07

## 2024-05-02 RX ORDER — LOPERAMIDE HCL 2 MG
4 CAPSULE ORAL
Status: CANCELLED | OUTPATIENT
Start: 2024-05-02

## 2024-05-02 RX ORDER — IBUPROFEN 200 MG
800 TABLET ORAL
Status: CANCELLED | OUTPATIENT
Start: 2024-05-02 | End: 2024-05-07

## 2024-05-02 RX ORDER — OXYTOCIN/0.9 % SODIUM CHLORIDE 30/500 ML
340 PLASTIC BAG, INJECTION (ML) INTRAVENOUS CONTINUOUS PRN
Status: CANCELLED | OUTPATIENT
Start: 2024-05-02

## 2024-05-02 RX ORDER — OXYTOCIN/0.9 % SODIUM CHLORIDE 30/500 ML
100-340 PLASTIC BAG, INJECTION (ML) INTRAVENOUS CONTINUOUS PRN
Status: CANCELLED | OUTPATIENT
Start: 2024-05-02

## 2024-05-02 RX ORDER — FENTANYL CITRATE 50 UG/ML
100 INJECTION, SOLUTION INTRAMUSCULAR; INTRAVENOUS
Status: CANCELLED | OUTPATIENT
Start: 2024-05-02

## 2024-05-02 RX ORDER — LIDOCAINE 40 MG/G
CREAM TOPICAL
Status: CANCELLED | OUTPATIENT
Start: 2024-05-02

## 2024-05-02 RX ORDER — NALOXONE HYDROCHLORIDE 0.4 MG/ML
0.4 INJECTION, SOLUTION INTRAMUSCULAR; INTRAVENOUS; SUBCUTANEOUS
Status: CANCELLED | OUTPATIENT
Start: 2024-05-02

## 2024-05-02 RX ORDER — LOPERAMIDE HCL 2 MG
2 CAPSULE ORAL
Status: CANCELLED | OUTPATIENT
Start: 2024-05-02

## 2024-05-02 RX ORDER — CARBOPROST TROMETHAMINE 250 UG/ML
250 INJECTION, SOLUTION INTRAMUSCULAR
Status: CANCELLED | OUTPATIENT
Start: 2024-05-02

## 2024-05-02 RX ORDER — METHYLERGONOVINE MALEATE 0.2 MG/ML
200 INJECTION INTRAVENOUS
Status: CANCELLED | OUTPATIENT
Start: 2024-05-02

## 2024-05-02 RX ORDER — MISOPROSTOL 100 UG/1
400 TABLET ORAL
Status: CANCELLED | OUTPATIENT
Start: 2024-05-02

## 2024-05-03 ENCOUNTER — HOSPITAL ENCOUNTER (INPATIENT)
Facility: HOSPITAL | Age: 39
LOS: 1 days | Discharge: HOME | End: 2024-05-04
Attending: FAMILY MEDICINE | Admitting: FAMILY MEDICINE
Payer: COMMERCIAL

## 2024-05-03 DIAGNOSIS — O48.0 POST-TERM PREGNANCY, 40-42 WEEKS OF GESTATION: ICD-10-CM

## 2024-05-03 PROBLEM — Z34.90 PREGNANCY: Status: ACTIVE | Noted: 2024-05-03

## 2024-05-03 LAB
ABO/RH(D): NORMAL
ANTIBODY SCREEN: NEGATIVE
HGB BLD-MCNC: 13.2 G/DL (ref 11.7–15.7)
PLATELET # BLD AUTO: 194 10E3/UL (ref 150–450)
SPECIMEN EXPIRATION DATE: NORMAL

## 2024-05-03 PROCEDURE — 85049 AUTOMATED PLATELET COUNT: CPT | Performed by: FAMILY MEDICINE

## 2024-05-03 PROCEDURE — 120N000001 HC R&B MED SURG/OB

## 2024-05-03 PROCEDURE — 0KQM0ZZ REPAIR PERINEUM MUSCLE, OPEN APPROACH: ICD-10-PCS | Performed by: FAMILY MEDICINE

## 2024-05-03 PROCEDURE — 722N000001 HC LABOR CARE VAGINAL DELIVERY SINGLE

## 2024-05-03 PROCEDURE — 85018 HEMOGLOBIN: CPT | Performed by: FAMILY MEDICINE

## 2024-05-03 PROCEDURE — 250N000009 HC RX 250: Performed by: FAMILY MEDICINE

## 2024-05-03 PROCEDURE — 86900 BLOOD TYPING SEROLOGIC ABO: CPT | Performed by: FAMILY MEDICINE

## 2024-05-03 PROCEDURE — 250N000013 HC RX MED GY IP 250 OP 250 PS 637: Performed by: FAMILY MEDICINE

## 2024-05-03 PROCEDURE — 36415 COLL VENOUS BLD VENIPUNCTURE: CPT | Performed by: FAMILY MEDICINE

## 2024-05-03 RX ORDER — IBUPROFEN 800 MG/1
800 TABLET, FILM COATED ORAL
Status: DISCONTINUED | OUTPATIENT
Start: 2024-05-03 | End: 2024-05-04 | Stop reason: HOSPADM

## 2024-05-03 RX ORDER — NALOXONE HYDROCHLORIDE 0.4 MG/ML
0.2 INJECTION, SOLUTION INTRAMUSCULAR; INTRAVENOUS; SUBCUTANEOUS
Status: DISCONTINUED | OUTPATIENT
Start: 2024-05-03 | End: 2024-05-03 | Stop reason: HOSPADM

## 2024-05-03 RX ORDER — LIDOCAINE 40 MG/G
CREAM TOPICAL
Status: DISCONTINUED | OUTPATIENT
Start: 2024-05-03 | End: 2024-05-03 | Stop reason: HOSPADM

## 2024-05-03 RX ORDER — DOCUSATE SODIUM 100 MG/1
100 CAPSULE, LIQUID FILLED ORAL DAILY
Status: DISCONTINUED | OUTPATIENT
Start: 2024-05-03 | End: 2024-05-03

## 2024-05-03 RX ORDER — LOPERAMIDE HCL 2 MG
2 CAPSULE ORAL
Status: DISCONTINUED | OUTPATIENT
Start: 2024-05-03 | End: 2024-05-03 | Stop reason: HOSPADM

## 2024-05-03 RX ORDER — CITRIC ACID/SODIUM CITRATE 334-500MG
30 SOLUTION, ORAL ORAL
Status: DISCONTINUED | OUTPATIENT
Start: 2024-05-03 | End: 2024-05-03 | Stop reason: HOSPADM

## 2024-05-03 RX ORDER — MISOPROSTOL 200 UG/1
400 TABLET ORAL
Status: DISCONTINUED | OUTPATIENT
Start: 2024-05-03 | End: 2024-05-03 | Stop reason: HOSPADM

## 2024-05-03 RX ORDER — MISOPROSTOL 200 UG/1
400 TABLET ORAL
Status: DISCONTINUED | OUTPATIENT
Start: 2024-05-03 | End: 2024-05-04 | Stop reason: HOSPADM

## 2024-05-03 RX ORDER — LOPERAMIDE HCL 2 MG
4 CAPSULE ORAL
Status: DISCONTINUED | OUTPATIENT
Start: 2024-05-03 | End: 2024-05-04 | Stop reason: HOSPADM

## 2024-05-03 RX ORDER — CARBOPROST TROMETHAMINE 250 UG/ML
250 INJECTION, SOLUTION INTRAMUSCULAR
Status: DISCONTINUED | OUTPATIENT
Start: 2024-05-03 | End: 2024-05-04 | Stop reason: HOSPADM

## 2024-05-03 RX ORDER — IBUPROFEN 800 MG/1
800 TABLET, FILM COATED ORAL EVERY 6 HOURS PRN
Status: DISCONTINUED | OUTPATIENT
Start: 2024-05-03 | End: 2024-05-04 | Stop reason: HOSPADM

## 2024-05-03 RX ORDER — NALOXONE HYDROCHLORIDE 0.4 MG/ML
0.4 INJECTION, SOLUTION INTRAMUSCULAR; INTRAVENOUS; SUBCUTANEOUS
Status: DISCONTINUED | OUTPATIENT
Start: 2024-05-03 | End: 2024-05-03 | Stop reason: HOSPADM

## 2024-05-03 RX ORDER — METHYLERGONOVINE MALEATE 0.2 MG/ML
200 INJECTION INTRAVENOUS
Status: DISCONTINUED | OUTPATIENT
Start: 2024-05-03 | End: 2024-05-03 | Stop reason: HOSPADM

## 2024-05-03 RX ORDER — MODIFIED LANOLIN
OINTMENT (GRAM) TOPICAL
Status: DISCONTINUED | OUTPATIENT
Start: 2024-05-03 | End: 2024-05-04 | Stop reason: HOSPADM

## 2024-05-03 RX ORDER — KETOROLAC TROMETHAMINE 30 MG/ML
30 INJECTION, SOLUTION INTRAMUSCULAR; INTRAVENOUS
Status: DISCONTINUED | OUTPATIENT
Start: 2024-05-03 | End: 2024-05-04 | Stop reason: HOSPADM

## 2024-05-03 RX ORDER — LOPERAMIDE HCL 2 MG
4 CAPSULE ORAL
Status: DISCONTINUED | OUTPATIENT
Start: 2024-05-03 | End: 2024-05-03 | Stop reason: HOSPADM

## 2024-05-03 RX ORDER — CARBOPROST TROMETHAMINE 250 UG/ML
250 INJECTION, SOLUTION INTRAMUSCULAR
Status: DISCONTINUED | OUTPATIENT
Start: 2024-05-03 | End: 2024-05-03 | Stop reason: HOSPADM

## 2024-05-03 RX ORDER — OXYTOCIN 10 [USP'U]/ML
10 INJECTION, SOLUTION INTRAMUSCULAR; INTRAVENOUS
Status: DISCONTINUED | OUTPATIENT
Start: 2024-05-03 | End: 2024-05-04 | Stop reason: HOSPADM

## 2024-05-03 RX ORDER — OXYTOCIN/0.9 % SODIUM CHLORIDE 30/500 ML
340 PLASTIC BAG, INJECTION (ML) INTRAVENOUS CONTINUOUS PRN
Status: DISCONTINUED | OUTPATIENT
Start: 2024-05-03 | End: 2024-05-03 | Stop reason: HOSPADM

## 2024-05-03 RX ORDER — FENTANYL CITRATE 50 UG/ML
100 INJECTION, SOLUTION INTRAMUSCULAR; INTRAVENOUS
Status: DISCONTINUED | OUTPATIENT
Start: 2024-05-03 | End: 2024-05-03 | Stop reason: HOSPADM

## 2024-05-03 RX ORDER — LOPERAMIDE HCL 2 MG
2 CAPSULE ORAL
Status: DISCONTINUED | OUTPATIENT
Start: 2024-05-03 | End: 2024-05-04 | Stop reason: HOSPADM

## 2024-05-03 RX ORDER — METHYLERGONOVINE MALEATE 0.2 MG/ML
200 INJECTION INTRAVENOUS
Status: DISCONTINUED | OUTPATIENT
Start: 2024-05-03 | End: 2024-05-04 | Stop reason: HOSPADM

## 2024-05-03 RX ORDER — OXYTOCIN/0.9 % SODIUM CHLORIDE 30/500 ML
340 PLASTIC BAG, INJECTION (ML) INTRAVENOUS CONTINUOUS PRN
Status: DISCONTINUED | OUTPATIENT
Start: 2024-05-03 | End: 2024-05-04 | Stop reason: HOSPADM

## 2024-05-03 RX ORDER — OXYTOCIN 10 [USP'U]/ML
10 INJECTION, SOLUTION INTRAMUSCULAR; INTRAVENOUS
Status: DISCONTINUED | OUTPATIENT
Start: 2024-05-03 | End: 2024-05-03 | Stop reason: HOSPADM

## 2024-05-03 RX ORDER — OXYTOCIN/0.9 % SODIUM CHLORIDE 30/500 ML
100-340 PLASTIC BAG, INJECTION (ML) INTRAVENOUS CONTINUOUS PRN
Status: DISCONTINUED | OUTPATIENT
Start: 2024-05-03 | End: 2024-05-04 | Stop reason: HOSPADM

## 2024-05-03 RX ORDER — ACETAMINOPHEN 325 MG/1
650 TABLET ORAL EVERY 4 HOURS PRN
Status: DISCONTINUED | OUTPATIENT
Start: 2024-05-03 | End: 2024-05-04 | Stop reason: HOSPADM

## 2024-05-03 RX ORDER — TRANEXAMIC ACID 10 MG/ML
1 INJECTION, SOLUTION INTRAVENOUS EVERY 30 MIN PRN
Status: DISCONTINUED | OUTPATIENT
Start: 2024-05-03 | End: 2024-05-03 | Stop reason: HOSPADM

## 2024-05-03 RX ORDER — TRANEXAMIC ACID 10 MG/ML
1 INJECTION, SOLUTION INTRAVENOUS EVERY 30 MIN PRN
Status: DISCONTINUED | OUTPATIENT
Start: 2024-05-03 | End: 2024-05-04 | Stop reason: HOSPADM

## 2024-05-03 RX ORDER — VITAMIN A ACETATE, .BETA.-CAROTENE, ASCORBIC ACID, CHOLECALCIFEROL, .ALPHA.-TOCOPHEROL ACETATE, DL-, THIAMINE MONONITRATE, RIBOFLAVIN, NIACINAMIDE, PYRIDOXINE HYDROCHLORIDE, FOLIC ACID, CYANOCOBALAMIN, CALCIUM CARBONATE, FERROUS FUMARATE, ZINC OXIDE, AND CUPRIC OXIDE 2000; 2000; 120; 400; 22; 1.84; 3; 20; 10; 1; 12; 200; 27; 25; 2 [IU]/1; [IU]/1; MG/1; [IU]/1; MG/1; MG/1; MG/1; MG/1; MG/1; MG/1; UG/1; MG/1; MG/1; MG/1; MG/1
1 TABLET ORAL DAILY
Status: DISCONTINUED | OUTPATIENT
Start: 2024-05-04 | End: 2024-05-04 | Stop reason: HOSPADM

## 2024-05-03 RX ADMIN — Medication 60 MCG: at 12:30

## 2024-05-03 RX ADMIN — Medication 20 MCG: at 08:30

## 2024-05-03 RX ADMIN — Medication 40 MCG: at 10:31

## 2024-05-03 RX ADMIN — Medication 20 MCG: at 09:30

## 2024-05-03 RX ADMIN — Medication 40 MCG: at 11:30

## 2024-05-03 RX ADMIN — Medication 340 ML/HR: at 16:18

## 2024-05-03 RX ADMIN — IBUPROFEN 800 MG: 800 TABLET ORAL at 16:33

## 2024-05-03 ASSESSMENT — ACTIVITIES OF DAILY LIVING (ADL)
ADLS_ACUITY_SCORE: 20
ADLS_ACUITY_SCORE: 18
ADLS_ACUITY_SCORE: 19
ADLS_ACUITY_SCORE: 18
ADLS_ACUITY_SCORE: 20
ADLS_ACUITY_SCORE: 18
ADLS_ACUITY_SCORE: 20
ADLS_ACUITY_SCORE: 18
ADLS_ACUITY_SCORE: 19
ADLS_ACUITY_SCORE: 18

## 2024-05-03 NOTE — PLAN OF CARE
Labor Shift Note  Data: See Flowsheets activity for contraction and fetal documentation.   Vitals:    24 0738 24 0742 24 1115 24 1530   BP: 139/78 139/78 132/77 137/78   BP Location: Left arm  Left arm Right arm   Patient Position: Semi-Molina's  Semi-Molina's Other (comments)   Cuff Size: Adult Regular  Adult Regular Adult Regular   Pulse: 97  78 85   Resp: 16  16 18   Temp:  97.7  F (36.5  C) 97.5  F (36.4  C) 97.9  F (36.6  C)   TempSrc:  Oral Oral Oral   SpO2: 98%  98% 98%   . Signs and symptoms of infection Absent.    Complications in labor: Absent. Support person Julio present.  Interventions: Continue uterine/fetal assessment per orders and nursing discretion. Vital Signs per order set. Comfort measures/pain control/labor management: Ambulation, hydration, position changes, birthing ball/sling and tub options to facilitate labor.  Pain medication options of Nitrous Oxide, Fentanyl IV and epidural reviewed with pt. Pt is interested in Nitrous Oxide in use  Plan: Anticipate . Provide labor/coping assistance as needed by patient and support person.  Observe for and notify care provider of indications of progressing labor, need for pain medications, or signs of fetal/maternal compromise.

## 2024-05-03 NOTE — H&P
No significant change in general health status based on examination of the patient, review of Nursing Admission Database and prenatal record.See prenatal record. Has a history of going to 42 weeks. Is 41 6/7 weeks now.    Uncomplicated prenatal course. B+. GBS-. Actually has changed from closed on 4/29 to 2.5 cm, 20% effaced and 0 station today. FHT category 1.    Wants low intervention labor. Will discuss all interventions as they arise. Discussed risks and benefits of induction vs prolonging past 42 weeks. She chooses to proceed. We discussed AROM vs. Misoprostol cervical ripening. We will go with cervical ripening first.    EFW: 7lb 8oz Has measured small with her babies. Growth US has shown a normal sized baby.

## 2024-05-03 NOTE — PLAN OF CARE
Vaginal Delivery Note   of viable Female.  Nursery RN Kailyn present.  Infant with spontaneous cry, to mother's abdomen, dried and stimulated-refused. Madeleine cares provided.  Mother and baby in stable condition. Baby skin-to-skin with mom. ID bands placed on infant, mom and dad.

## 2024-05-03 NOTE — PLAN OF CARE
Induction of Labor Admit Note  Letitia Whittaker  MRN: [id number]  Gestational Age: 41w6d      Letitia Whittaker presents for induction of labor for Post dates.  Patient denies contractions, bleeding or ROM.    No past medical history on file.  OB Induction Plan: Completed and available in epic chart.    Dr. Luciano notified of patient's arrival and condition.      Patient is alert and oriented X 3, denies any pain. pain. Patient oriented to room, unit, hourly rounding, and plan of care. Call light within reach.  Explained admission packet with patient bill of rights brochure. Will continue to monitor and document as needed.     Inpatient nursing criteria listed below was met:    Health care directives status obtained and documented: No  Patient identifies a surrogate decision maker: Yes   If yes, who:Julio Contact Information: In chart  Core Measure diagnosis present:: No  Vaccine assessment done and vaccines ordered if appropriate. Yes  Clergy visit ordered if patient requests: N/A  Skin issues/needs documented:N/A  Isolation needs addressed, if appropriate: N/A  Fall Prevention (Med and High risk): Care plan updated, Education given and documented and signage used: Yes  Care Plan initiated: Yes  Education Documented (Reminder to educate patient if MRSA is present on admission): Yes  Education Assessment documented:Yes  Patient has discharge needs no    Plan:   Oral Cytotec. MD at bedside to discuss plan of care with pt prior to administration of Cytotec.

## 2024-05-03 NOTE — L&D DELIVERY NOTE
Multip presented at 41 6/7 weeks for elective induction. Went into spontaneous labor after misoprostol cervical ripening. Handled labor beautifully with nitrous oxide for pain control. Small second degree laceration did not require repair. Mom and baby are bonding at the time of this note.    Delivery Summary    Letitia Whittaker MRN# [id number]   Age: 38 year old YOB: 1985        Panfilo, Female-Letitia [[id number]]      Labor Event Times      Latent labor onset date/time: 5/3/2024 1000    Active labor onset date: 5/3/24 Onset time:  3:30 PM   Dilation complete date: 5/3/24 Complete time:  4:09 PM   Start pushing date/time: 5/3/2024 1609          Labor Length      1st Stage (hrs): 0 (min): 39   2nd Stage (hrs): 0 (min): 6   3rd Stage (hrs): 0 (min): 8          Labor Events     labor?: No   steroids: None  Labor Type: Induction/Cervical ripening  Predominate monitoring during 1st stage: intermittent auscultation     Antibiotics received during labor?: No       Rupture date/time: 5/3/24 1615   Rupture type: Spontaneous Rupture of Membranes  Fluid color: Clear     Induction: Misoprostol  Induction date/time:      Cervical ripening date/time: 5/3/24 0830    Indications for induction: Post-term Gestation     Augmentation: None       Delivery/Placenta Date and Time      Delivery Date: 5/3/24 Delivery Time:  4:15 PM   Placenta Date/Time: 5/3/2024  4:23 PM  Oxytocin given at the time of delivery: after delivery of baby  Delivering clinician: Ross Luciano MD   Other personnel present at delivery:  Provider Role   Asmita Cardenas RN Registered Nurse   Faiza Mitchell RN Registered Nurse   Kailyn Fields RN Registered Nurse             Vaginal Counts       Initial count performed by 2 team members:  Two Team Members   KARAN, RN   CW, RN         Needles Suture Needles Sponges (RETIRED) Instruments   Initial counts 2 0 15    Added to count 0 0 0    Relief counts       Final counts 2 0  "15            Placed during labor Accounted for at the end of labor   FSE NA NA   IUPC NA NA   Cervidil NA NA                  Final count performed by 2 team members:  Two Team Members   CW, RN   CG, RN      Final count correct?: Yes       Apgars    Living status: Living   1 Minute 5 Minute 10 Minute 15 Minute 20 Minute   Skin color: 1  2       Heart rate: 2  2       Reflex irritability: 1  2       Muscle tone: 2  2       Respiratory effort: 1  1       Total: 7  9       Apgars assigned by: KAILYN RED RN       Cord      Vessels: 3 Vessels    Cord Complications: None               Cord Blood Disposition: Discard    Gases Sent?: No    Delayed cord clamping?: Yes    Cord Clamping Delay (seconds): >120 seconds    Stem cell collection?: No            Resuscitation    Methods: None  [place] Care at Delivery: Vaginal Delivery Note   of viable Female.  Nursery RN Kailyn CHOI RN  present.  Infant with delayed spontaneous cry, to mother's abdomen, Mother refused stimulation and drying, hat was applied.  Madeleine cares provided.  Mother and baby in stable condition. Baby skin-to-skin with mom. ID bands placed on infant, mom and Dad.         D[place] Measurements      Weight: 7 lb 14.6 oz Length: 1' 8.5\"     Head circumference: 34.3 cm Chest circumference: 34.3 cm   Abdominal girth: 29.2 cm       Skin to Skin and Feeding Plan      Skin to skin initiation date/time: 1841    Skin to skin with: Mother  Skin to skin end date/time:     Breastfeeding initiated date/time: 5/3/2024 1617       Labor Events and Shoulder Dystocia    Fetal Tracing Prior to Delivery: Category 1  Shoulder dystocia present?: Neg       Delivery (Maternal) (Provider to Complete) ([id number])    Episiotomy: None  Perineal lacerations: 2nd Repaired?: No   Repair suture: None  Genital tract inspection done: Pos       Blood Loss  Mother: Letitia Whittaker #[id number]     Start of Mother's Information      Delivery Blood Loss  24 1530 - 24 1937 "      Delivery QBL (mL) Hospital Encounter 10 mL    Total  10 mL               End of Mother's Information  Mother: Letitia Whittaker #[id number]                Delivery - Provider to Complete ([id number])    Delivering clinician: Ross Luciano MD  Delivery Type (Choose the 1 that will go to the Birth History): Vaginal, Spontaneous                         Other personnel:  Provider Role   Asmita Cardenas, RN Registered Nurse   Faiza Mitchell RN Registered Nurse   Kailyn Fields, RN Registered Nurse                    Placenta    Date/Time: 5/3/2024  4:23 PM  Removal: Spontaneous  Disposition: Hospital disposal             Anesthesia    Method: Nitrous Oxide                    Presentation and Position    Presentation: Vertex    Position: Right Occiput Anterior                     Ross Luciano MD

## 2024-05-03 NOTE — PROGRESS NOTES
An hour after 60 mcg dose, is having uncomfortable contractions q 3-4 minutes. Cervix 3.5 cm, 50% effaced, +1 station. FHT category one. Will hold further cervical ripening as appears to be moving into labor.

## 2024-05-03 NOTE — PROGRESS NOTES
Some cramping with Cytotec. Just got second 40 mcg dose. Mild contraction to palpation. No FHT decels. Continue cervical ripening.

## 2024-05-04 VITALS
HEART RATE: 83 BPM | OXYGEN SATURATION: 96 % | DIASTOLIC BLOOD PRESSURE: 66 MMHG | TEMPERATURE: 98.5 F | SYSTOLIC BLOOD PRESSURE: 122 MMHG | WEIGHT: 164 LBS | RESPIRATION RATE: 16 BRPM | BODY MASS INDEX: 24.94 KG/M2

## 2024-05-04 PROBLEM — Z34.80 SUPERVISION OF OTHER NORMAL PREGNANCY, ANTEPARTUM: Status: RESOLVED | Noted: 2019-11-20 | Resolved: 2024-05-04

## 2024-05-04 PROBLEM — Z34.90 PREGNANCY: Status: RESOLVED | Noted: 2024-05-03 | Resolved: 2024-05-04

## 2024-05-04 PROBLEM — O48.0 POST-DATES PREGNANCY, DELIVERED, CURRENT HOSPITALIZATION: Status: ACTIVE | Noted: 2024-05-04

## 2024-05-04 PROCEDURE — 250N000013 HC RX MED GY IP 250 OP 250 PS 637: Performed by: FAMILY MEDICINE

## 2024-05-04 RX ORDER — MODIFIED LANOLIN
OINTMENT (GRAM) TOPICAL
COMMUNITY
Start: 2024-05-04

## 2024-05-04 RX ORDER — IBUPROFEN 200 MG
800 TABLET ORAL EVERY 8 HOURS PRN
COMMUNITY
Start: 2024-05-04

## 2024-05-04 RX ADMIN — PRENATAL VITAMINS-IRON FUMARATE 27 MG IRON-FOLIC ACID 0.8 MG TABLET 1 TABLET: at 09:17

## 2024-05-04 RX ADMIN — IBUPROFEN 800 MG: 800 TABLET ORAL at 05:01

## 2024-05-04 RX ADMIN — IBUPROFEN 800 MG: 800 TABLET ORAL at 15:56

## 2024-05-04 RX ADMIN — BENZOCAINE AND LEVOMENTHOL: 200; 5 SPRAY TOPICAL at 05:03

## 2024-05-04 ASSESSMENT — ACTIVITIES OF DAILY LIVING (ADL)
ADLS_ACUITY_SCORE: 19

## 2024-05-04 NOTE — PLAN OF CARE
Problem: Adult Inpatient Plan of Care  Goal: Plan of Care Review    Outcome: Progressing  Flowsheets (Taken 5/4/2024 0556)  Outcome Evaluation: up and active in room doing all of own and baby cares. handle baby with ease on breast feeding.  states ibuprofen helps with pain of cramping, voids in a good amount. fundus firm and scant vaginal bleeding noted.  Plan of Care Reviewed With: patient  Overall Patient Progress: improving  Goal: Patient-Specific Goal (Individualized)    Outcome: Progressing  Goal: Absence of Hospital-Acquired Illness or Injury  Outcome: Progressing  Intervention: Prevent Infection  Recent Flowsheet Documentation  Taken 5/3/2024 2148 by Sayda Chanel RN  Infection Prevention: hand hygiene promoted  Goal: Optimal Comfort and Wellbeing  Outcome: Progressing  Intervention: Monitor Pain and Promote Comfort  Recent Flowsheet Documentation  Taken 5/4/2024 0546 by Sayda Chanel RN  Pain Management Interventions: medication (see MAR)  Intervention: Provide Person-Centered Care  Recent Flowsheet Documentation  Taken 5/4/2024 0420 by Sayda Chanel RN  Trust Relationship/Rapport:   care explained   choices provided   emotional support provided   questions encouraged   questions answered   reassurance provided   thoughts/feelings acknowledged  Taken 5/3/2024 2100 by Sayda Chanel RN  Trust Relationship/Rapport:   care explained   choices provided   emotional support provided   questions encouraged   questions answered   reassurance provided   thoughts/feelings acknowledged  Goal: Readiness for Transition of Care  Outcome: Progressing     Problem: Postpartum (Vaginal Delivery)  Goal: Successful Parent Role Transition  Outcome: Progressing  Intervention: Support Parent Role Transition  Recent Flowsheet Documentation  Taken 5/4/2024 0420 by Sayda Chanel RN  Supportive Measures:   active listening utilized   positive reinforcement provided   self-care encouraged  Parent-Child Attachment  Promotion: skin-to-skin contact encouraged  Taken 5/3/2024 2100 by Sayda Chanel RN  Supportive Measures:   active listening utilized   positive reinforcement provided   self-care encouraged  Parent-Child Attachment Promotion: skin-to-skin contact encouraged  Goal: Hemostasis  Outcome: Progressing  Goal: Absence of Infection Signs and Symptoms  Outcome: Progressing  Goal: Anesthesia/Sedation Recovery  Outcome: Progressing  Goal: Optimal Pain Control and Function  Outcome: Progressing  Intervention: Prevent or Manage Pain  Recent Flowsheet Documentation  Taken 5/4/2024 0546 by Sayda Chanel RN  Pain Management Interventions: medication (see MAR)  Goal: Effective Urinary Elimination  Outcome: Progressing  Intervention: Monitor and Manage Urinary Retention  Recent Flowsheet Documentation  Taken 5/4/2024 0420 by Sayda Chanel RN  Urinary Elimination Promotion: voiding relaxation promoted  Taken 5/3/2024 2100 by Sayda Chanel RN  Urinary Elimination Promotion: voiding relaxation promoted   Goal Outcome Evaluation:      Plan of Care Reviewed With: patient    Overall Patient Progress: improvingOverall Patient Progress: improving    Outcome Evaluation: up and active in room doing all of own and baby cares. handle baby with ease on breast feeding.  states ibuprofen helps with pain of cramping, voids in a good amount. fundus firm and scant vaginal bleeding noted.

## 2024-05-04 NOTE — DISCHARGE SUMMARY
[facility] Obstetrics Post-Partum Progress Note          Assessment and Plan:    Assessment:   Post-partum day #1  Normal spontaneous vaginal delivery  L&D complications: None      Doing well.      Plan:   Discharge later today           Interval History:   Doing well.  Pain is well-controlled.  No fevers.  No history of foul-smelling vaginal discharge.  Good appetite.  Denies chest pain, shortness of breath, nausea or vomiting.  Vaginal bleeding is similar to a heavy menstrual flow.  Ambulatory.  Breastfeeding well.          Significant Problems:    None          Review of Systems:    Positive only for afterpains.          Medications:        Review of your medicines        START taking        Dose / Directions   ibuprofen 200 MG tablet  Commonly known as: ADVIL/MOTRIN      Dose: 800 mg  Take 4 tablets (800 mg) by mouth every 8 hours as needed for pain (cramping)  Refills: 0     lanolin ointment      Apply topically every hour as needed for other (sore nipples)  Refills: 0            CONTINUE these medicines which have NOT CHANGED        Dose / Directions   prenatal multivitamin w/iron 27-0.8 MG tablet      Dose: 1 tablet  Take 1 tablet by mouth daily  Refills: 0     Vitamin D (Cholecalciferol) 25 MCG (1000 UT) Caps      Take by mouth daily  Refills: 0               Where to get your medicines        Some of these will need a paper prescription and others can be bought over the counter. Ask your nurse if you have questions.    You don't need a prescription for these medications  ibuprofen 200 MG tablet  lanolin ointment                Physical Exam:   Temp: 98.6  F (37  C) Temp src: Oral BP: 103/55 Pulse: 74   Resp: 16 SpO2: 96 % O2 Device: None (Room air)    Uterine fundus is firm, non-tender and at the level of the umbilicus          Data:     Lab Results   Component Value Date    WBC 10.2 03/25/2020    HGB 13.2 05/03/2024    HCT 35.2 03/25/2020    MCV 91 03/25/2020     05/03/2024     No  imaging studies have been ordered    Ross Luciano MD

## 2024-05-04 NOTE — PLAN OF CARE
Face to face report given with opportunity to observe patient.    Report given to KEVIN Jacobo RN   5/3/2024  7:16 PM

## 2024-05-04 NOTE — PLAN OF CARE
Patient discharged at 6:57 PM via wheel chair accompanied by spouse and staff. Prescriptions - None ordered for discharge. All belongings sent with patient.     Discharge instructions reviewed with Letitia. Listed belongings gathered and returned to patient.     Patient discharged to Home.     Core Measures and Vaccines  Core Measures applicable during stay: N/A  Pneumonia and Influenza given prior to discharge, if indicated: N/A    Surgical Patient   Surgical Procedures during stay: N/A  Did patient receive discharge instruction on wound care and recognition of infection symptoms? N/A    MISC  Follow up appointment made:  Yes  Home and hospital aquired medications returned to patient: N/A  Patient reports pain was well managed at discharge: Yes

## 2024-05-04 NOTE — PLAN OF CARE
Assessments completed as charted. B/P: 122/66, T: 98.5, P: 83, R: 16. Rates pain: 0/10 . Voiding without difficulty. Fundus: Midline Firm 3 below U. Lochia: Light. Activity: unrestricted with out pain . Infant feeding: Breast feeding going well.           Postpartum breastfeeding assessment completed and education provided, see Patient Education Activity.  Items included in the education are:   proper positioning and latch  effectiveness of feeding  manual expression  handling and storing breastmilk  maintenance of breastfeeding for the first 6 months  sign/symptoms of infant feeding issues requiring referral to qualified health care provider  Postpartum care education provided, see Patient Education activity. Patient denies needs. Will monitor.  Asmita Cardenas RN

## 2024-05-04 NOTE — DISCHARGE INSTRUCTIONS
Warning Signs after Having a Baby    Keep this paper on your fridge or somewhere else where you can see it.    Call your provider if you have any of these symptoms up to 12 weeks after having your baby.    Thoughts of hurting yourself or your baby  Pain in your chest or trouble breathing  Severe headache not helped by pain medicine  Eyesight concerns (blurry vision, seeing spots or flashes of light, other changes to eyesight)  Fainting, shaking or other signs of a seizure    Call 9-1-1 if you feel that it is an emergency.     The symptoms below can happen to anyone after giving birth. They can be very serious. Call your provider if you have any of these warning signs.    My provider s phone number: _______________________    Losing too much blood (hemorrhage)    Call your provider if you soak through a pad in less than an hour or pass blood clots bigger than a golf ball. These may be signs that you are bleeding too much.    Blood clots in the legs or lungs    After you give birth, your body naturally clots its blood to help prevent blood loss. Sometimes this increased clotting can happen in other areas of the body, like the legs or lungs. This can block your blood flow and be very dangerous.     Call your provider if you:  Have a red, swollen spot on the back of your leg that is warm or painful when you touch it.   Are coughing up blood.     Infection    Call your provider if you have any of these symptoms:  Fever of 100.4 F (38 C) or higher.  Pain or redness around your stitches if you had an incision.   Any yellow, white, or green fluid coming from places where you had stitches or surgery.    Mood Problems (postpartum depression)    Many people feel sad or have mood changes after having a baby. But for some people, these mood swings are worse.     Call your provider right away if you feel so anxious or nervous that you can't care for yourself or your baby.    Preeclampsia (high blood pressure)    Even if you  didn't have high blood pressure when you were pregnant, you are at risk for the high blood pressure disease called preeclampsia. This risk can last up to 12 weeks after giving birth.     Call your provider if you have:   Pain on your right side under your rib cage  Sudden swelling in the hands and face    Remember: You know your body. If something doesn't feel right, get medical help.     For informational purposes only. Not to replace the advice of your health care provider. Copyright 2020 [facility]. All rights reserved. Clinically reviewed by Sri Bennett, RNC-OB, MSN. [employer] [id number] - Rev 02/23.

## 2024-05-07 NOTE — PROGRESS NOTES
Fetal Non-Stress Test Results    NST Ordered By: Ross Luciano MD    NST Medical Indication: post dates    NST Start & Stop Times  NST Start Time: 0810  NST Stop Time: 0840                            NST Results  Fetus A   Baseline Rate: 135  Accelerations: Present  Decelerations: None  Interpretation: reactive

## 2024-08-13 ENCOUNTER — THERAPY VISIT (OUTPATIENT)
Dept: PHYSICAL THERAPY | Facility: HOSPITAL | Age: 39
End: 2024-08-13
Attending: STUDENT IN AN ORGANIZED HEALTH CARE EDUCATION/TRAINING PROGRAM
Payer: COMMERCIAL

## 2024-08-13 DIAGNOSIS — M62.89 PELVIC FLOOR DYSFUNCTION IN FEMALE: Primary | ICD-10-CM

## 2024-08-13 PROCEDURE — 97161 PT EVAL LOW COMPLEX 20 MIN: CPT | Mod: GP

## 2024-08-13 PROCEDURE — 97530 THERAPEUTIC ACTIVITIES: CPT | Mod: GP

## 2024-08-13 NOTE — PROGRESS NOTES
PHYSICAL THERAPY EVALUATION  Type of Visit: Evaluation and treatment               Subjective       Presenting condition or subjective complaint: Past history with POP would like tools for continuwd management  Date of onset: 08/13/24    Relevant medical history:     Dates & types of surgery: 2005 oral surgery for implant    Prior diagnostic imaging/testing results:       Prior therapy history for the same diagnosis, illness or injury: Yes 2020 PFPT    Prior Level of Function  Transfers: Independent  Ambulation: Independent  ADL: Independent  IADL: Childcare, Work    Living Environment  Social support: With family members   Type of home: House; 1 level; Basement   Stairs to enter the home: Yes 4 Is there a railing: Yes     Ramp: No   Stairs inside the home: Yes 15 Is there a railing: Yes     Help at home: None  Equipment owned:       Employment: Yes    Hobbies/Interests:      Patient goals for therapy: Nothing really, i am progressing well and managing symptoms, i would like to continue to get stronger    Pain assessment: Pain denied     Objective      PELVIC EVALUATION  ADDITIONAL HISTORY:  Sex assigned at birth: Female  Gender identity: Female    Pronouns: She/Her Hers    Reports she has hypermobility syndrome to a degree.   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   Bladder History:  Feels bladder filling: Yes  Triggers for feeling of inability to wait to go to the bathroom: No    How long can you wait to urinate: Usualky as long as i need to  Gets up at night to urinate: Yes 1-2  Can stop the flow of urine when urinating: Yes  Volume of urine usually released: Medium   Other issues: Trouble emptying bladder completely; Dribbling after urinating  Number of bladder infections in last 12 months:  0  Fluid intake per day: 120oz 6-8oz    Medications taken for bladder: No     Activities causing urine leak:      Amount of urine typically leaked: Infrequent, sometimes when i atand up right after  urinating  Pads used to help with leaking: No        Bowel History:  Frequency of bowel movement: 1x per day  Consistency of stool: Soft    Ignores the urge to defecate: No  Other bowel issues:    Length of time spent trying to have a bowel movement:     Constipated occasionally- worries about prolapse if has to push too hard  Feels incomplete bowel emptying  Ribs L flared, she reports R Thoracic tightness.    Sexual Function History:  Sexual orientation: Straight    Sexually active: Yes  Lubrication used: No No  Pelvic pain:     n/a  Pain or difficulty with orgasms/erection/ejaculation: No    State of menopause:  not  Hormone medications: No    3rd baby born 3 months ago. 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   Are you currently pregnant: No  Number of previous pregnancies: 3  Number of deliveries: 3  If you have delivered before, did you have any of these issues during delivery: Tearing; Vaginal delivery  Have you been diagnosed with pelvic prolapse or abdominal separation: Yes  Do you get regular exercise: Yes  I do this type of exercise: Walk lift weights yoga  Have you tried pelvic floor strengthening exercises for 4 weeks: Yes  Do you have any history of trauma that is relevant to your care that you d like to share: No  Pregnancy and child birth went well. Did lots of breathing and walking t/o pregnancy.   Small tear which didn't need stitching- healed well. No vaginal pain.   Discussed reason for referral regarding pelvic health needs and external/internal pelvic floor muscle examination with patient/guardian.  Opportunity provided to ask questions and verbal consent for assessment and intervention was given.  LBP  per usual.   2 weeks postpartum having some symptoms of prolapse.   Postvoid dribbling, occas stress incontinence with heavy coughing or cold. Pt is instructed in post void techniques.   Walking and doing kegels. Breathing, bridging, squatting,   R foot hurts feels like she has wider foot.  LBP occas with nursing baby who is 15 #.    Prolapse grade 2  POSTURE: Sitting Posture: wfl,   LUMBAR SCREEN:   HIP SCREEN:  Strength:    Functional Strength Testing:     PELVIC/SI SCREEN:     PAIN PROVOCATION TEST:   PELVIS/SI SPECIAL TESTS:   BREATHING SYMMETRY:     PELVIC EXAM  External Visual Inspection:  At rest: Normal  With voluntary pelvic floor contraction: Perineal elevation  Relaxation of PFM: Partial/delayed relaxation    Integumentary:   Introitus: Asymmetric    External Digital Palpation per Perineum:         Internal Digital Palpation:  Per Vagina:  Tenderness  Myofascial Resistance to Palpation: Firm  Digital Muscle Performance: P (Power): 3  Relaxation Post-Contraction: Partial/delayed relaxation    Per Rectum:        Pelvic Organ Prolapse:       ABDOMINAL ASSESSMENT  Diastasis Rectus Abdominis (BROOKLYNN):  Location   Above Umbilicus Depth/Finger width: 1 to 1  1/2 finger length, 1 finger width, very min depth   Below Umbilicus Depth/Finger width: less than 1 finger width. Depth very minimal     Abdominal Activation/Strength:  appears overdominant upper abdomen/core    Scar:   Location/Type: very small midline perineal  Mobility:     Fascial Tension/Restriction:     Breathing with rib cage posteriorly. Is difficult- had signif rib flaring with pregnancy.   DERMATOMES:   DTR S:     Assessment & Plan   CLINICAL IMPRESSIONS  Medical Diagnosis: postpartum UI    Treatment Diagnosis: post partum UI   Impression/Assessment: Patient is a 39 year old female with UI, orthopaedic dysfunction,  complaints.  The following significant findings have been identified: Decreased joint mobility, Decreased strength, Decreased proprioception, Impaired muscle performance, Decreased activity tolerance, and Impaired posture. These impairments interfere with their ability to perform self care tasks and recreational activities as compared to previous level of function.     Clinical Decision Making (Complexity):  Clinical  Presentation: Evolving/Changing  Clinical Presentation Rationale: based on medical and personal factors listed in PT evaluation  Clinical Decision Making (Complexity): Low complexity    PLAN OF CARE  Treatment Interventions:  Modalities: Biofeedback  Interventions: Manual Therapy, Neuromuscular Re-education, Therapeutic Activity, Therapeutic Exercise    Long Term Goals     PT Goal 1  Goal Identifier: STG 1  Goal Description: Pt will demon indep HEP compliance  Target Date: 10/08/24  PT Goal 2  Goal Identifier: STG 2  Goal Description: Reduce UI by 75% to enable less than 1 leakage per week  Target Date: 10/08/24  PT Goal 3  Goal Identifier: LTG  Goal Description: ADLS,  and intercourse not limited by UI pelvic pain  Target Date: 11/05/24      Frequency of Treatment: 1xweek  Duration of Treatment: 12 weeks    Education Assessment:   Learner/Method: No Barriers to Learning    Risks and benefits of evaluation/treatment have been explained.   Patient/Family/caregiver agrees with Plan of Care.     Evaluation Time:          Signing Clinician: Mirtha Mejia, PT

## 2024-08-27 ENCOUNTER — THERAPY VISIT (OUTPATIENT)
Dept: PHYSICAL THERAPY | Facility: HOSPITAL | Age: 39
End: 2024-08-27
Attending: STUDENT IN AN ORGANIZED HEALTH CARE EDUCATION/TRAINING PROGRAM
Payer: COMMERCIAL

## 2024-08-27 DIAGNOSIS — M62.89 PELVIC FLOOR DYSFUNCTION IN FEMALE: ICD-10-CM

## 2024-08-27 PROCEDURE — 97110 THERAPEUTIC EXERCISES: CPT | Mod: GP

## 2024-08-27 PROCEDURE — 97530 THERAPEUTIC ACTIVITIES: CPT | Mod: GP

## 2024-08-27 PROCEDURE — 97140 MANUAL THERAPY 1/> REGIONS: CPT | Mod: GP

## 2024-09-03 ENCOUNTER — THERAPY VISIT (OUTPATIENT)
Dept: PHYSICAL THERAPY | Facility: HOSPITAL | Age: 39
End: 2024-09-03
Attending: STUDENT IN AN ORGANIZED HEALTH CARE EDUCATION/TRAINING PROGRAM
Payer: COMMERCIAL

## 2024-09-03 DIAGNOSIS — M62.89 PELVIC FLOOR DYSFUNCTION IN FEMALE: ICD-10-CM

## 2024-09-03 PROCEDURE — 97110 THERAPEUTIC EXERCISES: CPT | Mod: GP

## 2024-09-03 PROCEDURE — 97140 MANUAL THERAPY 1/> REGIONS: CPT | Mod: GP

## 2024-09-17 ENCOUNTER — THERAPY VISIT (OUTPATIENT)
Dept: PHYSICAL THERAPY | Facility: HOSPITAL | Age: 39
End: 2024-09-17
Attending: STUDENT IN AN ORGANIZED HEALTH CARE EDUCATION/TRAINING PROGRAM
Payer: COMMERCIAL

## 2024-09-17 DIAGNOSIS — M62.89 PELVIC FLOOR DYSFUNCTION IN FEMALE: ICD-10-CM

## 2024-09-17 PROCEDURE — 97530 THERAPEUTIC ACTIVITIES: CPT | Mod: GP

## 2024-09-17 PROCEDURE — 97110 THERAPEUTIC EXERCISES: CPT | Mod: GP

## 2024-09-17 PROCEDURE — 97140 MANUAL THERAPY 1/> REGIONS: CPT | Mod: GP

## 2024-10-01 ENCOUNTER — THERAPY VISIT (OUTPATIENT)
Dept: PHYSICAL THERAPY | Facility: HOSPITAL | Age: 39
End: 2024-10-01
Attending: STUDENT IN AN ORGANIZED HEALTH CARE EDUCATION/TRAINING PROGRAM
Payer: COMMERCIAL

## 2024-10-01 DIAGNOSIS — M62.89 PELVIC FLOOR DYSFUNCTION IN FEMALE: Primary | ICD-10-CM

## 2024-10-01 PROCEDURE — 97110 THERAPEUTIC EXERCISES: CPT | Mod: GP

## 2024-10-08 ENCOUNTER — OFFICE VISIT (OUTPATIENT)
Dept: FAMILY MEDICINE | Facility: OTHER | Age: 39
End: 2024-10-08
Attending: STUDENT IN AN ORGANIZED HEALTH CARE EDUCATION/TRAINING PROGRAM
Payer: COMMERCIAL

## 2024-10-08 VITALS
OXYGEN SATURATION: 98 % | HEART RATE: 72 BPM | BODY MASS INDEX: 22.1 KG/M2 | HEIGHT: 68 IN | TEMPERATURE: 97.3 F | SYSTOLIC BLOOD PRESSURE: 100 MMHG | WEIGHT: 145.8 LBS | DIASTOLIC BLOOD PRESSURE: 72 MMHG

## 2024-10-08 DIAGNOSIS — Z00.00 ROUTINE HISTORY AND PHYSICAL EXAMINATION OF ADULT: Primary | ICD-10-CM

## 2024-10-08 DIAGNOSIS — M75.41 IMPINGEMENT SYNDROME, SHOULDER, RIGHT: ICD-10-CM

## 2024-10-08 PROCEDURE — 99213 OFFICE O/P EST LOW 20 MIN: CPT | Mod: 25 | Performed by: STUDENT IN AN ORGANIZED HEALTH CARE EDUCATION/TRAINING PROGRAM

## 2024-10-08 PROCEDURE — 99395 PREV VISIT EST AGE 18-39: CPT | Performed by: STUDENT IN AN ORGANIZED HEALTH CARE EDUCATION/TRAINING PROGRAM

## 2024-10-08 SDOH — HEALTH STABILITY: PHYSICAL HEALTH: ON AVERAGE, HOW MANY DAYS PER WEEK DO YOU ENGAGE IN MODERATE TO STRENUOUS EXERCISE (LIKE A BRISK WALK)?: 6 DAYS

## 2024-10-08 ASSESSMENT — ANXIETY QUESTIONNAIRES
7. FEELING AFRAID AS IF SOMETHING AWFUL MIGHT HAPPEN: NOT AT ALL
GAD7 TOTAL SCORE: 0
IF YOU CHECKED OFF ANY PROBLEMS ON THIS QUESTIONNAIRE, HOW DIFFICULT HAVE THESE PROBLEMS MADE IT FOR YOU TO DO YOUR WORK, TAKE CARE OF THINGS AT HOME, OR GET ALONG WITH OTHER PEOPLE: NOT DIFFICULT AT ALL
3. WORRYING TOO MUCH ABOUT DIFFERENT THINGS: NOT AT ALL
GAD7 TOTAL SCORE: 0
GAD7 TOTAL SCORE: 0
2. NOT BEING ABLE TO STOP OR CONTROL WORRYING: NOT AT ALL
4. TROUBLE RELAXING: NOT AT ALL
8. IF YOU CHECKED OFF ANY PROBLEMS, HOW DIFFICULT HAVE THESE MADE IT FOR YOU TO DO YOUR WORK, TAKE CARE OF THINGS AT HOME, OR GET ALONG WITH OTHER PEOPLE?: NOT DIFFICULT AT ALL
1. FEELING NERVOUS, ANXIOUS, OR ON EDGE: NOT AT ALL
6. BECOMING EASILY ANNOYED OR IRRITABLE: NOT AT ALL
5. BEING SO RESTLESS THAT IT IS HARD TO SIT STILL: NOT AT ALL
7. FEELING AFRAID AS IF SOMETHING AWFUL MIGHT HAPPEN: NOT AT ALL

## 2024-10-08 ASSESSMENT — PATIENT HEALTH QUESTIONNAIRE - PHQ9
SUM OF ALL RESPONSES TO PHQ QUESTIONS 1-9: 0
SUM OF ALL RESPONSES TO PHQ QUESTIONS 1-9: 0
10. IF YOU CHECKED OFF ANY PROBLEMS, HOW DIFFICULT HAVE THESE PROBLEMS MADE IT FOR YOU TO DO YOUR WORK, TAKE CARE OF THINGS AT HOME, OR GET ALONG WITH OTHER PEOPLE: NOT DIFFICULT AT ALL

## 2024-10-08 ASSESSMENT — PAIN SCALES - GENERAL: PAINLEVEL: NO PAIN (0)

## 2024-10-08 ASSESSMENT — SOCIAL DETERMINANTS OF HEALTH (SDOH): HOW OFTEN DO YOU GET TOGETHER WITH FRIENDS OR RELATIVES?: TWICE A WEEK

## 2024-10-08 NOTE — PROGRESS NOTES
Preventive Care Visit  [facility]  Sigrid Escalona MD, Family Medicine  Oct 8, 2024      Assessment & Plan     Routine history and physical examination of adult  Letitia presents to clinic for her annual physical  She will be due for her first mammogram next year  She is UTD on her PAPs  She is UTD on her blood work  She declines flu and COVID today  She has lactational amenorrhea, she has a 5 month old baby    Impingement syndrome, shoulder, right  Positive empty can test, positive ernie lift off test on the right, Neer's test and tenderness to palpation about the subacromial region of the right shoulder.  This is clinically very consistent with supraspinatus pathology  History of injury during volleyball and fall onto right shoulder  She would like to do PT with [facility], referral placed  She declines steroid injection at this time and prefers to treat naturally.  Does have background knowledge in physical therapy  - Physical Therapy  Referral; Future            Counseling  Appropriate preventive services were addressed with this patient via screening, questionnaire, or discussion as appropriate for fall prevention, nutrition, physical activity, Tobacco-use cessation, social engagement, weight loss and cognition.  Checklist reviewing preventive services available has been given to the patient.  Reviewed patient's diet, addressing concerns and/or questions.   She is at risk for psychosocial distress and has been provided with information to reduce risk.         No follow-ups on file.    Subjective   Letitia is a 39 year old, presenting for the following:  Physical        10/8/2024     8:30 AM   Additional Questions   Roomed by Melo Gold   Accompanied by Self         10/8/2024     8:30 AM   Patient Reported Additional Medications   Patient reports taking the following new medications none        Health Care Directive  Patient does not have a Health Care Directive or Living Will:  Discussed advance care planning with patient; however, patient declined at this time.    HPI    Trouble gaining strength and keeping weight on.   Has 5 month old baby  Joints feel more loose, more hypermobile  Has not had her period back yet since breastfeeding.    Went to [facility] for her sister's wedding.    Has background in exercise physiology  Wants to see PT.        Musculoskeletal problem/pain    Duration: On and off for a few years   Description  Location: Right shoulder   Intensity:  mild, moderate  Accompanying signs and symptoms: none  History  Previous similar problem: YES- previous over use injury from sports   Previous evaluation:  none  Precipitating or alleviating factors:  Trauma or overuse: YES- volleyball injury   Aggravating factors include: lifting and sleeping on the shoulder  Therapies tried and outcome: At home physical therapy           10/8/2024   General Health   How would you rate your overall physical health? Excellent   Feel stress (tense, anxious, or unable to sleep) Only a little      (!) STRESS CONCERN      10/8/2024   Nutrition   Three or more servings of calcium each day? Yes   Diet: Regular (no restrictions)   How many servings of fruit and vegetables per day? 4 or more   How many sweetened beverages each day? 0-1            10/8/2024   Exercise   Days per week of moderate/strenous exercise 6 days            10/8/2024   Social Factors   Frequency of gathering with friends or relatives Twice a week   Worry food won't last until get money to buy more No   Food not last or not have enough money for food? No   Do you have housing? (Housing is defined as stable permanent housing and does not include staying ouside in a car, in a tent, in an abandoned building, in an overnight shelter, or couch-surfing.) Yes   Are you worried about losing your housing? No   Lack of transportation? No   Unable to get utilities (heat,electricity)? No            10/8/2024   Dental   Dentist two times every  year? Yes            10/8/2024   TB Screening   Were you born outside of the [place]? No          Today's PHQ-9 Score:       10/8/2024     8:24 AM   PHQ-9 SCORE   PHQ-9 Total Score MyChart 0   PHQ-9 Total Score 0         10/8/2024   Substance Use   Alcohol more than 3/day or more than 7/wk No   Do you use any other substances recreationally? No        Social History     Tobacco Use    Smoking status: Never     Passive exposure: Never    Smokeless tobacco: Never   Vaping Use    Vaping status: Never Used   Substance Use Topics    Alcohol use: Yes     Comment: Social        Mammogram Screening - Patient under 40 years of age: Routine Mammogram Screening not recommended.         10/8/2024   STI Screening   New sexual partner(s) since last STI/HIV test? No      History of abnormal Pap smear: No - age 30- 64 PAP with HPV every 5 years recommended        Latest Ref Rng & Units 10/3/2022     9:00 AM   PAP / HPV   PAP  Negative for Intraepithelial Lesion or Malignancy (NILM)    HPV 16 DNA Negative Negative    HPV 18 DNA Negative Negative    Other HR HPV Negative Negative            10/8/2024   Contraception/Family Planning   Questions about contraception or family planning No           Reviewed and updated as needed this visit by Provider                    No past medical history on file.  No past surgical history on file.  OB History    Para Term  AB Living   3 3 3 0 0 3   SAB IAB Ectopic Multiple Live Births   0 0 0 0 3      # Outcome Date GA Lbr Fabio/2nd Weight Sex Type Anes PTL Lv   3 Term 24 41w6d 00:39 / 00:06 3.589 kg (7 lb 14.6 oz) F Vag-Spont Nitrous N PRINCESS      Name: Catherine Whittaker      Apgar1: 7  Apgar5: 9   2 Term 20 41w6d 03:29 / 00:08 3.555 kg (7 lb 13.4 oz) F Vag-Spont INT N PRINCESS      Name: BRITTFEMALE-UBALDO      Apgar1: 9  Apgar5: 9   1 Term                  Review of Systems  Constitutional, HEENT, cardiovascular, pulmonary, GI, , musculoskeletal, neuro, skin, endocrine and  "psych systems are negative, except as otherwise noted.     Objective    Exam  /72 (BP Location: Right arm, Patient Position: Sitting, Cuff Size: Adult Regular)   Pulse 72   Temp 97.3  F (36.3  C) (Tympanic)   Ht 1.737 m (5' 8.39\")   Wt 66.1 kg (145 lb 12.8 oz)   SpO2 98%   Breastfeeding Yes   BMI 21.92 kg/m     Estimated body mass index is 21.92 kg/m  as calculated from the following:    Height as of this encounter: 1.737 m (5' 8.39\").    Weight as of this encounter: 66.1 kg (145 lb 12.8 oz).    Physical Exam  GENERAL: alert and no distress  EYES: Eyes grossly normal to inspection, PERRL and conjunctivae and sclerae normal  HENT: ear canals and TM's normal, nose and mouth without ulcers or lesions  NECK: no adenopathy, no asymmetry, masses, or scars  RESP: lungs clear to auscultation - no rales, rhonchi or wheezes  BREAST: normal without masses, tenderness or nipple discharge and no palpable axillary masses or adenopathy  CV: regular rate and rhythm, normal S1 S2, no S3 or S4, no murmur, click or rub, no peripheral edema  ABDOMEN: soft, nontender, no hepatosplenomegaly, no masses and bowel sounds normal   (female): deferred  MS: no gross musculoskeletal defects noted, no edema  SKIN: no suspicious lesions or rashes  NEURO: Normal strength and tone, mentation intact and speech normal  PSYCH: mentation appears normal, affect normal/bright        Signed Electronically by: Sigrid Escalona MD    Answers submitted by the patient for this visit:  Patient Health Questionnaire (Submitted on 10/8/2024)  If you checked off any problems, how difficult have these problems made it for you to do your work, take care of things at home, or get along with other people?: Not difficult at all  PHQ9 TOTAL SCORE: 0  Patient Health Questionnaire (G7) (Submitted on 10/8/2024)  ALEKSANDRA 7 TOTAL SCORE: 0    "

## 2024-10-24 ENCOUNTER — THERAPY VISIT (OUTPATIENT)
Dept: PHYSICAL THERAPY | Facility: HOSPITAL | Age: 39
End: 2024-10-24
Attending: STUDENT IN AN ORGANIZED HEALTH CARE EDUCATION/TRAINING PROGRAM
Payer: COMMERCIAL

## 2024-10-24 DIAGNOSIS — M62.89 PELVIC FLOOR DYSFUNCTION IN FEMALE: Primary | ICD-10-CM

## 2024-10-24 PROCEDURE — 97110 THERAPEUTIC EXERCISES: CPT | Mod: GP

## 2024-10-24 PROCEDURE — 97530 THERAPEUTIC ACTIVITIES: CPT | Mod: GP

## 2024-11-07 ENCOUNTER — THERAPY VISIT (OUTPATIENT)
Dept: PHYSICAL THERAPY | Facility: HOSPITAL | Age: 39
End: 2024-11-07
Attending: STUDENT IN AN ORGANIZED HEALTH CARE EDUCATION/TRAINING PROGRAM
Payer: COMMERCIAL

## 2024-11-07 DIAGNOSIS — M75.41 IMPINGEMENT SYNDROME, SHOULDER, RIGHT: ICD-10-CM

## 2024-11-07 PROCEDURE — 97161 PT EVAL LOW COMPLEX 20 MIN: CPT | Mod: GP

## 2024-11-07 PROCEDURE — 97110 THERAPEUTIC EXERCISES: CPT | Mod: GP

## 2024-11-07 PROCEDURE — 97530 THERAPEUTIC ACTIVITIES: CPT | Mod: GP

## 2024-11-07 NOTE — PROGRESS NOTES
PHYSICAL THERAPY EVALUATION  Type of Visit: Evaluation       Fall Risk Screen:  Fall screen completed by: PT  Have you fallen 2 or more times in the past year?: (Patient-Rptd) No  Have you fallen and had an injury in the past year?: (Patient-Rptd) No  Is patient a fall risk?: No    Subjective         Presenting condition or subjective complaint: (Patient-Rptd) Shoulder pain, tight and weak, feel nerve impingment type pain, shoulder feels unstable  Date of onset: 10/08/24    Relevant medical history:   Had baby 6 months ago  Dates & types of surgery: (Patient-Rptd) 2005 oral implant    Prior diagnostic imaging/testing results:     No  Prior therapy history for the same diagnosis, illness or injury: (Patient-Rptd) No      Prior Level of Function  Transfers: Independent  Ambulation: Independent    Living Environment  Social support: (Patient-Rptd) With family members   Type of home: (Patient-Rptd) House; 1 level; Basement   Stairs to enter the home: (Patient-Rptd) Yes (Patient-Rptd) 3 Is there a railing: (Patient-Rptd) Yes     Ramp: (Patient-Rptd) No   Stairs inside the home: (Patient-Rptd) Yes (Patient-Rptd) 10 Is there a railing: (Patient-Rptd) Yes     Help at home: (Patient-Rptd) None  Equipment owned:       Employment: (Patient-Rptd) Yes  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  Pain is Exacerbated By: push up at too low of an angle, lifting a heavier weight, bowling, overhead  volleyball serve  Pain is Relieved By: Chest stretch, sharp pain stops right away when she stops the aggravating movement    POSTURE: WNL  WEIGHTBEARING ALIGNMENT: Shoulder/UE WB Alignment:Protracted scapula L, Scapular downward rotation L, Protracted scapula R, Scapular downward rotation R  ROM: AROM WNL  Work mechanical stresses: Postural considerations due to desk job  Leisure mechanical stresses: Patient lifts weights 2-3x per week. She wants to get back to playing volleyball 1x per week.  Functional disability score (SPADI): 9.23  VAS score (0-10): 0 at rest  Handedness (R/L): Right hand dominant    ADDITIONAL HISTORY  Present symptoms: Sharp pain in anterior shoulder with certain activities, achyness of muscles around scapula.    Pain: Aching and Sharp  Symptoms (improving/unchanging/worsening):  Unchanging.  Symptoms commenced as a result of: She had shoulder pain on and off for years. Symptoms flared up with pregnancy and have been unchanging since then.   Constant symptoms: No  Intermittent symptoms: Yes, pain    Continued use makes the pain (better/worse/no effect): Worse    Disturbed night (yes/no):  no    Pain at rest (yes/no): no  Site (neck/shoulder/elbow/wrist/hand): right shoulder    Other questions (swelling/catching/clicking/locking/subluxing):  right shoulder feels unstable    Previous episodes: Hasn't had PT for this issue    Neurological:  Negative Marion test    Education:  Provided education on potential contributors to pt's symptoms and how to do exercises in HEP   Equipment provided:  None  Exercise type:  Lower trap, serratus anterior, and core activation Frequency:  5-6 days per week  STRENGTH: WNL  FLEXIBILITY: WNL Pt feels like she has some joint hypermobility  SPECIAL TESTS:    Left Right   Impingement     Hawkin's-Brandyn Negative  Positive   Labral  Negative   Crank Negative  Negative    Biceps   Equivocal palpation   Speed's Negative  Negative      PALPATION:  Mild tenderness with  palpation of right biceps tendon     STRENGTH:  R: 65, 55, 60 lbs  L: 70, 70, 68 lbs  TYLER: negative  ULTTs: No increased tension in median or ulnar nerves   SCAPULOHUMERAL RHYTHM: Unremarkable    Assessment & Plan   CLINICAL IMPRESSIONS  Medical Diagnosis: Right shoulder impingement syndrome    Treatment Diagnosis: Right shoulder impingement syndrome   Impression/Assessment: Patient is a 39 year old female with right shoulder pain complaints.  The following significant findings have been identified: Pain and Impaired muscle performance. These impairments interfere with their ability to perform recreational activities as compared to previous level of function.     Clinical Decision Making (Complexity):  Clinical Presentation: Stable/Uncomplicated  Clinical Presentation Rationale: based on medical and personal factors listed in PT evaluation  Clinical Decision Making (Complexity): Low complexity    PLAN OF CARE  Treatment Interventions:  Modalities: Cryotherapy, Dry Needling, E-stim, Hot Pack  Interventions: Manual Therapy, Neuromuscular Re-education, Therapeutic Activity, Therapeutic Exercise, Self-Care/Home Management    Long Term Goals     PT Goal 1  Goal Identifier: STG 1  Goal Description: Patient will be independent with a short-term home exercise program.  Target Date: 11/21/24  PT Goal 2  Goal Identifier: STG 2  Goal Description: Patient will understand and demonstrate improved posture and techniques such that patient places less strain over periscapular musculature.  Target Date: 11/21/24  PT Goal 3  Goal Identifier: LTG  Goal Description: Improve score on utilized outcome measures to correlate with clinically significant change.  Target Date: 01/02/25  PT Goal 4  Goal Identifier: LTG 2  Goal Description: Patient will endorse confidence in ability to manage home exercise program independently to promote continued progression and management of back pain symptoms following discharge from PT  services.  Target Date: 01/02/25      Frequency of Treatment: 1xweek  Duration of Treatment: 8 weeks      Education Assessment:   Learner/Method: Patient;Listening;Reading;Demonstration;Pictures/Video;No Barriers to Learning    Risks and benefits of evaluation/treatment have been explained.   Patient/Family/caregiver agrees with Plan of Care.     Evaluation Time:     PT Sheila, Low Complexity Minutes (97359): 20  Evaluation Only     Signing Clinician: Tara Nick, PT, DPT, NRP, Cert. DN    Patient evaluation and treatment was provided under the direct supervision and guidance of the co-signing physical therapist.  The co-signing physical therapist directed the skilled services and is responsible for the skilled judgment that went into all aspects of this patient's care.   Tara Nick DPT (Tom)  Physical Therapist

## 2024-11-14 ENCOUNTER — THERAPY VISIT (OUTPATIENT)
Dept: PHYSICAL THERAPY | Facility: HOSPITAL | Age: 39
End: 2024-11-14
Attending: STUDENT IN AN ORGANIZED HEALTH CARE EDUCATION/TRAINING PROGRAM
Payer: COMMERCIAL

## 2024-11-14 DIAGNOSIS — M75.41 IMPINGEMENT SYNDROME, SHOULDER, RIGHT: Primary | ICD-10-CM

## 2024-11-14 PROCEDURE — 97530 THERAPEUTIC ACTIVITIES: CPT | Mod: GP

## 2024-11-14 PROCEDURE — 97110 THERAPEUTIC EXERCISES: CPT | Mod: GP

## 2024-11-21 ENCOUNTER — THERAPY VISIT (OUTPATIENT)
Dept: PHYSICAL THERAPY | Facility: HOSPITAL | Age: 39
End: 2024-11-21
Attending: STUDENT IN AN ORGANIZED HEALTH CARE EDUCATION/TRAINING PROGRAM
Payer: COMMERCIAL

## 2024-11-21 DIAGNOSIS — M75.41 IMPINGEMENT SYNDROME, SHOULDER, RIGHT: Primary | ICD-10-CM

## 2024-11-21 PROCEDURE — 97110 THERAPEUTIC EXERCISES: CPT | Mod: GP

## 2024-12-02 ENCOUNTER — MYC MEDICAL ADVICE (OUTPATIENT)
Dept: FAMILY MEDICINE | Facility: OTHER | Age: 39
End: 2024-12-02

## 2024-12-02 NOTE — TELEPHONE ENCOUNTER
Pt called and has a lump in the groin are right side of pubic bone and thigh area.  Pt reports that it is not painful.  Pt reports that she has been fighting a cold.  Pt scheduled with PCP on 12/04/2024. Pt was offered and appointment for tomorrow, but declined.

## 2024-12-03 NOTE — PROGRESS NOTES
Assessment & Plan     Inguinal bulge  Small palpable lump in right groin that is not reducible.  Firm, mobile and well circumscribed.    On exam, feels more like a LN than a hernia.  The size of this bump has not changed recently  Patient is without any B-type symptoms.  No fevers, chills, night sweats, weight loss,   Will evaluate with US first.  Low index of suspicion for malignancy  - US Hernia Evaluation; Future      No follow-ups on file.    Subjective   Letitia is a 39 year old, presenting for the following health issues:  Imm/Inj (Flu Shot) and Imm/Inj (COVID-19 VACCINE)        2024     3:03 PM   Additional Questions   Roomed by Elizabeth MOORE   Accompanied by self         2024     3:04 PM   Patient Reported Additional Medications   Patient reports taking the following new medications post      History of Present Illness       Reason for visit:  Possible hernia  Symptom onset:  More than a month  Symptoms include:  Small lump some pain  Symptom intensity:  Mild  Symptom progression:  Staying the same  Had these symptoms before:  Yes  Has tried/received treatment for these symptoms:  No  What makes it worse:  On my feet a lot overdoing it  What makes it better:  Rest   She is taking medications regularly.     Goes away lying down.    Has not had US to date  Symptoms started in January    Had a cold with prolonged coughing.        Concern - Lump near groin  Onset: originally noticed in January when pregnant- over the weekend noticed it again   Description: lump on right groin area notices it more when she is active, was active this weekend and straining while coughing. Has noticed more symptoms on right side abdominal.   Intensity: mild  Progression of Symptoms:  same  Accompanying Signs & Symptoms: when flared up there is a burning or ache on right side abdomen and groin area.   Previous history of similar problem: no but first time she noticed it was last January while pregnant- never got it  "checked but thinks it is the same   Precipitating factors:        Worsened by: lots of activity   Alleviating factors:        Improved by: resting   Therapies tried and outcome: resting         Review of Systems  Constitutional, HEENT, cardiovascular, pulmonary, GI, , musculoskeletal, neuro, skin, endocrine and psych systems are negative, except as otherwise noted.      Objective    BP 96/68 (BP Location: Right arm, Patient Position: Sitting, Cuff Size: Adult Regular)   Pulse 74   Temp 99  F (37.2  C) (Tympanic)   Ht 1.737 m (5' 8.39\")   Wt 67.1 kg (148 lb)   LMP 11/11/2024 (Approximate)   SpO2 99%   Breastfeeding Yes   BMI 22.25 kg/m    Body mass index is 22.25 kg/m .  Physical Exam   GENERAL: alert and no distress  EYES: Eyes grossly normal to inspection, PERRL and conjunctivae and sclerae normal  HENT: ear canals and TM's normal, nose and mouth without ulcers or lesions  NECK: no adenopathy, no asymmetry, masses, or scars  RESP: lungs clear to auscultation - no rales, rhonchi or wheezes  CV: regular rate and rhythm, normal S1 S2, no S3 or S4, no murmur, click or rub, no peripheral edema  ABDOMEN: soft, nontender, no hepatosplenomegaly, no masses and bowel sounds normal  MS: no gross musculoskeletal defects noted, no edema  SKIN: no suspicious lesions or rashes  NEURO: Normal strength and tone, mentation intact and speech normal  PSYCH: mentation appears normal, affect normal/bright  Groin: Small palpable lump in right groin that is no reducible.  Firm, mobile and well circumscribed.    On exam, feels more like a LN than a hernia        Signed Electronically by: Sigrid Escalona MD    "

## 2024-12-04 ENCOUNTER — THERAPY VISIT (OUTPATIENT)
Dept: PHYSICAL THERAPY | Facility: HOSPITAL | Age: 39
End: 2024-12-04
Attending: STUDENT IN AN ORGANIZED HEALTH CARE EDUCATION/TRAINING PROGRAM
Payer: COMMERCIAL

## 2024-12-04 ENCOUNTER — OFFICE VISIT (OUTPATIENT)
Dept: FAMILY MEDICINE | Facility: OTHER | Age: 39
End: 2024-12-04
Attending: STUDENT IN AN ORGANIZED HEALTH CARE EDUCATION/TRAINING PROGRAM
Payer: COMMERCIAL

## 2024-12-04 VITALS
BODY MASS INDEX: 22.43 KG/M2 | OXYGEN SATURATION: 99 % | HEART RATE: 74 BPM | DIASTOLIC BLOOD PRESSURE: 68 MMHG | TEMPERATURE: 99 F | SYSTOLIC BLOOD PRESSURE: 96 MMHG | WEIGHT: 148 LBS | HEIGHT: 68 IN

## 2024-12-04 DIAGNOSIS — Z23 HIGH PRIORITY FOR 2019-NCOV VACCINE: ICD-10-CM

## 2024-12-04 DIAGNOSIS — M75.41 IMPINGEMENT SYNDROME, SHOULDER, RIGHT: Primary | ICD-10-CM

## 2024-12-04 DIAGNOSIS — R19.09 INGUINAL BULGE: ICD-10-CM

## 2024-12-04 DIAGNOSIS — Z23 NEED FOR PROPHYLACTIC VACCINATION AND INOCULATION AGAINST INFLUENZA: Primary | ICD-10-CM

## 2024-12-04 PROCEDURE — 97110 THERAPEUTIC EXERCISES: CPT | Mod: GP,CQ

## 2024-12-04 ASSESSMENT — PAIN SCALES - GENERAL: PAINLEVEL_OUTOF10: NO PAIN (0)

## 2024-12-11 ENCOUNTER — THERAPY VISIT (OUTPATIENT)
Dept: PHYSICAL THERAPY | Facility: HOSPITAL | Age: 39
End: 2024-12-11
Attending: STUDENT IN AN ORGANIZED HEALTH CARE EDUCATION/TRAINING PROGRAM
Payer: COMMERCIAL

## 2024-12-11 DIAGNOSIS — M75.41 IMPINGEMENT SYNDROME, SHOULDER, RIGHT: Primary | ICD-10-CM

## 2024-12-11 PROCEDURE — 97530 THERAPEUTIC ACTIVITIES: CPT | Mod: GP

## 2024-12-11 PROCEDURE — 97110 THERAPEUTIC EXERCISES: CPT | Mod: GP

## 2024-12-20 ENCOUNTER — HOSPITAL ENCOUNTER (OUTPATIENT)
Dept: CT IMAGING | Facility: HOSPITAL | Age: 39
Discharge: HOME | End: 2024-12-20
Attending: STUDENT IN AN ORGANIZED HEALTH CARE EDUCATION/TRAINING PROGRAM | Admitting: STUDENT IN AN ORGANIZED HEALTH CARE EDUCATION/TRAINING PROGRAM
Payer: COMMERCIAL

## 2024-12-20 DIAGNOSIS — R19.09 MASS OF RIGHT INGUINAL REGION: ICD-10-CM

## 2024-12-20 PROCEDURE — 74177 CT ABD & PELVIS W/CONTRAST: CPT

## 2024-12-20 PROCEDURE — 250N000011 HC RX IP 250 OP 636: Performed by: RADIOLOGY

## 2024-12-20 RX ORDER — IOPAMIDOL 755 MG/ML
72 INJECTION, SOLUTION INTRAVASCULAR ONCE
Status: COMPLETED | OUTPATIENT
Start: 2024-12-20 | End: 2024-12-20

## 2024-12-20 RX ADMIN — IOPAMIDOL 72 ML: 755 INJECTION, SOLUTION INTRAVENOUS at 12:48

## 2024-12-23 ENCOUNTER — THERAPY VISIT (OUTPATIENT)
Dept: PHYSICAL THERAPY | Facility: HOSPITAL | Age: 39
End: 2024-12-23
Attending: STUDENT IN AN ORGANIZED HEALTH CARE EDUCATION/TRAINING PROGRAM
Payer: COMMERCIAL

## 2024-12-23 DIAGNOSIS — M62.89 PELVIC FLOOR DYSFUNCTION IN FEMALE: Primary | ICD-10-CM

## 2024-12-23 PROCEDURE — 97110 THERAPEUTIC EXERCISES: CPT | Mod: GP

## 2024-12-23 PROCEDURE — 97530 THERAPEUTIC ACTIVITIES: CPT | Mod: GP

## 2024-12-23 PROCEDURE — 97140 MANUAL THERAPY 1/> REGIONS: CPT | Mod: GP

## 2024-12-26 ENCOUNTER — THERAPY VISIT (OUTPATIENT)
Dept: PHYSICAL THERAPY | Facility: HOSPITAL | Age: 39
End: 2024-12-26
Attending: STUDENT IN AN ORGANIZED HEALTH CARE EDUCATION/TRAINING PROGRAM
Payer: COMMERCIAL

## 2024-12-26 DIAGNOSIS — M75.41 IMPINGEMENT SYNDROME, SHOULDER, RIGHT: Primary | ICD-10-CM

## 2024-12-26 PROCEDURE — 97110 THERAPEUTIC EXERCISES: CPT | Mod: GP

## 2024-12-26 PROCEDURE — 97530 THERAPEUTIC ACTIVITIES: CPT | Mod: GP

## 2025-01-12 ENCOUNTER — E-VISIT (OUTPATIENT)
Dept: URGENT CARE | Facility: CLINIC | Age: 40
End: 2025-01-12
Payer: COMMERCIAL

## 2025-01-12 DIAGNOSIS — R05.1 ACUTE COUGH: Primary | ICD-10-CM

## 2025-01-12 PROCEDURE — 99207 PR NON-BILLABLE SERV PER CHARTING: CPT | Performed by: PHYSICIAN ASSISTANT

## 2025-01-12 NOTE — PATIENT INSTRUCTIONS
Dear Letitia Whittaker,    We are sorry you are not feeling well. Based on the responses you provided, it is recommended that you be seen in-person in urgent care so we can better evaluate your symptoms. Please click here to find the nearest urgent care location to you.   You will not be charged for this Visit. Thank you for trusting us with your care.    Sana Gauthier PA-C

## 2025-01-13 ENCOUNTER — OFFICE VISIT (OUTPATIENT)
Dept: FAMILY MEDICINE | Facility: OTHER | Age: 40
End: 2025-01-13
Attending: STUDENT IN AN ORGANIZED HEALTH CARE EDUCATION/TRAINING PROGRAM
Payer: COMMERCIAL

## 2025-01-13 VITALS
RESPIRATION RATE: 16 BRPM | HEART RATE: 79 BPM | HEIGHT: 68 IN | WEIGHT: 148 LBS | BODY MASS INDEX: 22.43 KG/M2 | DIASTOLIC BLOOD PRESSURE: 73 MMHG | SYSTOLIC BLOOD PRESSURE: 106 MMHG | TEMPERATURE: 98.7 F | OXYGEN SATURATION: 97 %

## 2025-01-13 DIAGNOSIS — B96.89 ACUTE BACTERIAL BRONCHITIS: Primary | ICD-10-CM

## 2025-01-13 DIAGNOSIS — J20.8 ACUTE BACTERIAL BRONCHITIS: Primary | ICD-10-CM

## 2025-01-13 PROBLEM — O48.0 POST-DATES PREGNANCY, DELIVERED, CURRENT HOSPITALIZATION: Status: RESOLVED | Noted: 2024-05-04 | Resolved: 2025-01-13

## 2025-01-13 RX ORDER — AZITHROMYCIN 250 MG/1
TABLET, FILM COATED ORAL
Qty: 6 TABLET | Refills: 0 | Status: SHIPPED | OUTPATIENT
Start: 2025-01-13 | End: 2025-01-18

## 2025-01-13 ASSESSMENT — PAIN SCALES - GENERAL: PAINLEVEL_OUTOF10: MILD PAIN (2)

## 2025-01-13 NOTE — PROGRESS NOTES
Assessment & Plan     Acute bacterial bronchitis  Symptoms ongoing 14 days.  Minimal improvement over that time.  No further fevers or significant dyspnea.  No wheezing.  Vitals reassuring.  Lungs overall clear.  Questionably decreased on the right.  Discussed option for chest x-ray and patient declined.  Will plan to cover for bacterial bronchitis/atypical pneumonia.  Discussed hydration.  Should utilize Mucinex.  If not improving in the next 3 to 4 days, needs to be reevaluated with a chest x-ray.  Patient was understanding and in agreement with the plan.  Concerning signs or symptoms that should prompt urgent follow-up were discussed.  - azithromycin (ZITHROMAX) 250 MG tablet; Take 2 tablets (500 mg) by mouth daily for 1 day, THEN 1 tablet (250 mg) daily for 4 days.            Subjective   Letitia is a 39 year old, presenting for the following health issues:  Illness        1/13/2025     9:25 AM   Additional Questions   Roomed by Angelica Gonzáles   Accompanied by None         1/13/2025     9:25 AM   Patient Reported Additional Medications   Patient reports taking the following new medications None     History of Present Illness       Reason for visit:  Acute cough  Symptom onset:  1-2 weeks ago  Symptoms include:  Acute cough and as coughing progresses trouble taking full breath  Symptom intensity:  Severe  Symptom progression:  Staying the same  Had these symptoms before:  No  What makes it worse:  More activity  What makes it better:  Rest and sleep drinkng tea   She is taking medications regularly.       Acute Illness  Acute illness concerns: cough, fatigue, body aches   Onset/Duration: since new years edwige - 2 weeks of illness   Symptoms:  Fever: No  Chills/Sweats: YES  Headache (location?): No  Sinus Pressure: No  Conjunctivitis:  No  Ear Pain: no  Rhinorrhea: YES  Congestion: YES  Sore Throat: No  Cough: YES-productive of clear sputum  Wheeze: no  Decreased Appetite: No  Nausea: No  Vomiting:  "No  Diarrhea: No  Dysuria/Freq.: No  Dysuria or Hematuria: No  Fatigue/Achiness: YES  Sick/Strep Exposure: No  Therapies tried and outcome: Robitussin     Kids were sick before her.   Fever new years edwige and day, maybe again two days later.   Rested.   Cough started after that - body aches. 2 steps forward, 1 step back.   Taking a lot of robitussin. Still coughing at night.   Last few days hard to take a full normal breath without coughing.   No post tussive emesis. Daughter did once.   No history of asthma.   Last this sick two years ago - led to sinus infxn.   No tooth pain, headache or sinus pressure   Home covid test negative         Objective    /73 (BP Location: Right arm, Patient Position: Sitting, Cuff Size: Adult Regular)   Pulse 79   Temp 98.7  F (37.1  C) (Tympanic)   Resp 16   Ht 1.737 m (5' 8.39\")   Wt 67.1 kg (148 lb)   LMP 11/11/2024 (Approximate)   SpO2 97%   BMI 22.25 kg/m    Body mass index is 22.25 kg/m .    Physical Exam  Constitutional:       General: She is not in acute distress.     Appearance: Normal appearance. She is not ill-appearing.   HENT:      Right Ear: Tympanic membrane and external ear normal.      Left Ear: Tympanic membrane and external ear normal.      Nose: No mucosal edema, congestion or rhinorrhea.      Right Sinus: No maxillary sinus tenderness or frontal sinus tenderness.      Left Sinus: No maxillary sinus tenderness or frontal sinus tenderness.      Mouth/Throat:      Mouth: Mucous membranes are moist.      Pharynx: Oropharynx is clear. No oropharyngeal exudate or posterior oropharyngeal erythema.      Tonsils: No tonsillar exudate or tonsillar abscesses.   Eyes:      General: No scleral icterus.        Right eye: No discharge.         Left eye: No discharge.      Extraocular Movements: Extraocular movements intact.      Conjunctiva/sclera: Conjunctivae normal.      Pupils: Pupils are equal, round, and reactive to light.   Cardiovascular:      Rate and " Rhythm: Normal rate and regular rhythm.      Heart sounds: No murmur heard.  Pulmonary:      Effort: Pulmonary effort is normal.      Breath sounds: Normal breath sounds. No wheezing, rhonchi or rales.      Comments: Occasional harsh wet cough  Abdominal:      Palpations: Abdomen is soft.      Tenderness: There is no abdominal tenderness.   Musculoskeletal:      Cervical back: Normal range of motion and neck supple. No tenderness.      Right lower leg: No edema.      Left lower leg: No edema.   Lymphadenopathy:      Cervical: No cervical adenopathy.   Skin:     General: Skin is warm and dry.      Capillary Refill: Capillary refill takes less than 2 seconds.   Neurological:      General: No focal deficit present.      Mental Status: She is alert and oriented to person, place, and time.   Psychiatric:         Mood and Affect: Mood normal.         Behavior: Behavior normal.                    Signed Electronically by: Jenny Rosario MD

## 2025-01-13 NOTE — PATIENT INSTRUCTIONS
Mucinex can help with cough/congestion   Azithromycin for five days     While on antibiotics, it's recommended you take probiotics, eat more yogurt or drink Kefir, to promote good bacteria in your gut. Doing this for 2 weeks after finishing the antibiotics is also recommended to minimize GI side effects, like diarrhea. For probiotics, ones that have >1,000,000 colony forming units (CFUs) are good to take.

## 2025-08-09 ENCOUNTER — HEALTH MAINTENANCE LETTER (OUTPATIENT)
Age: 40
End: 2025-08-09

## 2025-09-03 ENCOUNTER — E-VISIT (OUTPATIENT)
Dept: URGENT CARE | Facility: CLINIC | Age: 40
End: 2025-09-03
Payer: COMMERCIAL

## 2025-09-03 DIAGNOSIS — J01.90 ACUTE BACTERIAL SINUSITIS: Primary | ICD-10-CM

## 2025-09-03 DIAGNOSIS — B96.89 ACUTE BACTERIAL SINUSITIS: Primary | ICD-10-CM
